# Patient Record
Sex: MALE | Race: WHITE
[De-identification: names, ages, dates, MRNs, and addresses within clinical notes are randomized per-mention and may not be internally consistent; named-entity substitution may affect disease eponyms.]

---

## 2017-08-05 ENCOUNTER — HOSPITAL ENCOUNTER (EMERGENCY)
Dept: HOSPITAL 47 - EC | Age: 42
Discharge: HOME | End: 2017-08-05
Payer: COMMERCIAL

## 2017-08-05 VITALS
SYSTOLIC BLOOD PRESSURE: 120 MMHG | HEART RATE: 64 BPM | TEMPERATURE: 97.5 F | RESPIRATION RATE: 18 BRPM | DIASTOLIC BLOOD PRESSURE: 73 MMHG

## 2017-08-05 DIAGNOSIS — F17.200: ICD-10-CM

## 2017-08-05 DIAGNOSIS — R07.81: Primary | ICD-10-CM
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
## 2017-08-05 PROCEDURE — 99284 EMERGENCY DEPT VISIT MOD MDM: CPT

## 2017-08-05 NOTE — XR
EXAMINATION TYPE: XR ribs LT w pa chest xray

 

DATE OF EXAM: 8/5/2017

 

CLINICAL HISTORY: Chest and left-sided rib pain after fall from tree injury.

 

TECHNIQUE: Single frontal view of the chest is obtained. A frontal and oblique images of the left-raven
ed ribs are acquired.

 

COMPARISON: None

 

FINDINGS:  There is no focal air space opacity, pleural effusion, or pneumothorax seen.  The cardiac 
silhouette size is within normal limits.   The osseous structures are intact.

 

Dedicated images of the left-sided ribs show no acute displaced left-sided rib fracture. Overlying so
ft tissue is unremarkable.

 

IMPRESSION:  No acute cardiopulmonary process. No acute displaced left-sided rib fractures are eviden
t.

## 2017-08-05 NOTE — ED
Chest Pain HPI





- General


Chief Complaint: Chest Pain


Stated Complaint: Rib Pain


Time Seen by Provider: 08/05/17 17:08


Source: patient, EMS


Mode of arrival: EMS





- History of Present Illness


Initial Comments: 


42-year-old male patient presents to emergency department by EMS today for 

evaluation of left rib pain.  Patient states that about 30 minutes ago he was 

up in a tree, he was standing on a branch attempting to bring down the limb 

above him.  Patient states that the branch he was standing on cracked so he 

grabbed onto another branch and "bear hugged it ".  Patient states that the 

branch dug into his ribs.  Patient states that since then he has been having 

pain in his left ribs when he takes deep breaths, or moves at all.  Patient 

denies any fall or other injuries.  Patient denies any shortness of breath, 

abdominal pain, back pain, nausea, vomiting, dizziness, or weakness.








- Related Data


 Previous Rx's











 Medication  Instructions  Recorded


 


Hydrocodone/Acetaminophen [Norco 1 tab PO Q6HR PRN #15 tab 08/05/17





5-325]  


 


Ibuprofen 600 mg PO Q6H PRN #20 tablet 08/05/17











 Allergies











Allergy/AdvReac Type Severity Reaction Status Date / Time


 


No Known Allergies Allergy   Verified 08/05/17 17:42














Review of Systems


ROS Statement: 


Those systems with pertinent positive or pertinent negative responses have been 

documented in the HPI.





ROS Other: All systems not noted in ROS Statement are negative.





Past Medical History


Past Medical History: No Reported History


History of Any Multi-Drug Resistant Organisms: None Reported


Past Surgical History: Unable to Obtain


Past Psychological History: No Psychological Hx Reported


Smoking Status: Current every day smoker


Past Alcohol Use History: Heavy


Past Drug Use History: None Reported





General Exam


General appearance: alert, in no apparent distress


Head exam: Present: atraumatic, normocephalic, normal inspection


Eye exam: Present: normal appearance, PERRL, EOMI.  Absent: scleral icterus, 

conjunctival injection, periorbital swelling


Neck exam: Present: normal inspection, full ROM.  Absent: tenderness, 

meningismus, lymphadenopathy


Respiratory exam: Present: normal lung sounds bilaterally, other (Tenderness 

over the left seventh and eighth ribs near the midclavicular line).  Absent: 

respiratory distress, wheezes, rales, rhonchi, stridor


Cardiovascular Exam: Present: regular rate, normal rhythm, normal heart sounds.

  Absent: systolic murmur, diastolic murmur, rubs, gallop, clicks


GI/Abdominal exam: Present: soft, normal bowel sounds.  Absent: distended, 

tenderness, guarding, rebound, rigid


Extremities exam: Present: normal inspection, full ROM, normal capillary 

refill.  Absent: tenderness, pedal edema, joint swelling, calf tenderness


Back exam: Present: normal inspection.  Absent: tenderness, vertebral tenderness


Neurological exam: Present: alert, oriented X3, CN II-XII intact


Psychiatric exam: Present: normal affect, normal mood


Skin exam: Present: warm, dry, intact, normal color.  Absent: rash





Course


 Vital Signs











  08/05/17 08/05/17





  17:08 18:05


 


Temperature 98.1 F 98.3 F


 


Pulse Rate 73 66


 


Respiratory 20 20





Rate  


 


Blood Pressure 137/74 136/83


 


O2 Sat by Pulse 97 97





Oximetry  














Chest Pain MDM





- MDM


42-year-old male patient presented to the emergency department for evaluation 

of left rib pain after injury.  Chest x-ray was performed and showed no acute 

cardiopulmonary process, x-ray left ribs are obtained showed no acute osseous 

abnormalities.  She'll be discharged home with prescription for ibuprofen as 

well as Norco for pain management.  Patient also given instructions regarding 

coughing and deep breathing.  Patient will be given incentive spirometer.  

Patient injected to follow-up with his primary care physician one to 2 days for 

recheck.  Patient instructed to return for any new, worsening, or concerning 

symptoms.  Patient verbalizes understanding and agrees with this plan.








Disposition


Clinical Impression: 


 Rib pain on left side





Disposition: HOME SELF-CARE


Condition: Good


Instructions:  Costochondritis (ED)


Additional Instructions: 


Splint area with pillow.  Perform coughing and deep breathing exercises at 

least 10 times per hour all week.  Follow up with primary care physician in one 

to 2 days for recheck.  Return for any new, worsening, or concerning symptoms.


Prescriptions: 


Hydrocodone/Acetaminophen [Norco 5-325] 1 tab PO Q6HR PRN #15 tab


 PRN Reason: Pain


Ibuprofen 600 mg PO Q6H PRN #20 tablet


 PRN Reason: Pain


Referrals: 


None,Stated [Primary Care Provider] - 1-2 days


Time of Disposition: 18:22

## 2018-04-02 ENCOUNTER — HOSPITAL ENCOUNTER (EMERGENCY)
Dept: HOSPITAL 47 - EC | Age: 43
LOS: 1 days | Discharge: HOME | End: 2018-04-03
Payer: COMMERCIAL

## 2018-04-02 DIAGNOSIS — F10.120: Primary | ICD-10-CM

## 2018-04-02 DIAGNOSIS — F17.200: ICD-10-CM

## 2018-04-02 DIAGNOSIS — F43.20: ICD-10-CM

## 2018-04-02 DIAGNOSIS — R07.9: ICD-10-CM

## 2018-04-02 LAB
ALBUMIN SERPL-MCNC: 5.1 G/DL (ref 3.5–5)
ALP SERPL-CCNC: 109 U/L (ref 38–126)
ALT SERPL-CCNC: 23 U/L (ref 21–72)
ANION GAP SERPL CALC-SCNC: 20 MMOL/L
APTT BLD: 24.7 SEC (ref 22–30)
AST SERPL-CCNC: 34 U/L (ref 17–59)
BASOPHILS # BLD AUTO: 0.1 K/UL (ref 0–0.2)
BASOPHILS NFR BLD AUTO: 1 %
BUN SERPL-SCNC: 8 MG/DL (ref 9–20)
CALCIUM SPEC-MCNC: 9.5 MG/DL (ref 8.4–10.2)
CHLORIDE SERPL-SCNC: 104 MMOL/L (ref 98–107)
CK SERPL-CCNC: 217 U/L (ref 55–170)
CO2 SERPL-SCNC: 23 MMOL/L (ref 22–30)
EOSINOPHIL # BLD AUTO: 0.3 K/UL (ref 0–0.7)
EOSINOPHIL NFR BLD AUTO: 2 %
ERYTHROCYTE [DISTWIDTH] IN BLOOD BY AUTOMATED COUNT: 5.6 M/UL (ref 4.3–5.9)
ERYTHROCYTE [DISTWIDTH] IN BLOOD: 13 % (ref 11.5–15.5)
GLUCOSE SERPL-MCNC: 82 MG/DL (ref 74–99)
HCT VFR BLD AUTO: 52.5 % (ref 39–53)
HGB BLD-MCNC: 17.6 GM/DL (ref 13–17.5)
INR PPP: 1 (ref ?–1.2)
LIPASE SERPL-CCNC: 297 U/L (ref 23–300)
LYMPHOCYTES # SPEC AUTO: 3.4 K/UL (ref 1–4.8)
LYMPHOCYTES NFR SPEC AUTO: 25 %
MAGNESIUM SPEC-SCNC: 2.2 MG/DL (ref 1.6–2.3)
MCH RBC QN AUTO: 31.5 PG (ref 25–35)
MCHC RBC AUTO-ENTMCNC: 33.6 G/DL (ref 31–37)
MCV RBC AUTO: 93.6 FL (ref 80–100)
MONOCYTES # BLD AUTO: 0.5 K/UL (ref 0–1)
MONOCYTES NFR BLD AUTO: 4 %
NEUTROPHILS # BLD AUTO: 8.9 K/UL (ref 1.3–7.7)
NEUTROPHILS NFR BLD AUTO: 67 %
PLATELET # BLD AUTO: 300 K/UL (ref 150–450)
POTASSIUM SERPL-SCNC: 3.9 MMOL/L (ref 3.5–5.1)
PROT SERPL-MCNC: 8.4 G/DL (ref 6.3–8.2)
PT BLD: 10 SEC (ref 9–12)
SODIUM SERPL-SCNC: 147 MMOL/L (ref 137–145)
TROPONIN I SERPL-MCNC: <0.012 NG/ML (ref 0–0.03)
WBC # BLD AUTO: 13.3 K/UL (ref 3.8–10.6)

## 2018-04-02 PROCEDURE — 80053 COMPREHEN METABOLIC PANEL: CPT

## 2018-04-02 PROCEDURE — 96361 HYDRATE IV INFUSION ADD-ON: CPT

## 2018-04-02 PROCEDURE — 83735 ASSAY OF MAGNESIUM: CPT

## 2018-04-02 PROCEDURE — 84484 ASSAY OF TROPONIN QUANT: CPT

## 2018-04-02 PROCEDURE — 82075 ASSAY OF BREATH ETHANOL: CPT

## 2018-04-02 PROCEDURE — 83690 ASSAY OF LIPASE: CPT

## 2018-04-02 PROCEDURE — 93005 ELECTROCARDIOGRAM TRACING: CPT

## 2018-04-02 PROCEDURE — 85610 PROTHROMBIN TIME: CPT

## 2018-04-02 PROCEDURE — 36415 COLL VENOUS BLD VENIPUNCTURE: CPT

## 2018-04-02 PROCEDURE — 96360 HYDRATION IV INFUSION INIT: CPT

## 2018-04-02 PROCEDURE — 82550 ASSAY OF CK (CPK): CPT

## 2018-04-02 PROCEDURE — 85730 THROMBOPLASTIN TIME PARTIAL: CPT

## 2018-04-02 PROCEDURE — 99285 EMERGENCY DEPT VISIT HI MDM: CPT

## 2018-04-02 PROCEDURE — 71046 X-RAY EXAM CHEST 2 VIEWS: CPT

## 2018-04-02 PROCEDURE — 80306 DRUG TEST PRSMV INSTRMNT: CPT

## 2018-04-02 PROCEDURE — 85025 COMPLETE CBC W/AUTO DIFF WBC: CPT

## 2018-04-02 PROCEDURE — 82553 CREATINE MB FRACTION: CPT

## 2018-04-02 NOTE — XR
EXAMINATION TYPE: XR chest 2V

 

DATE OF EXAM: 4/2/2018

 

COMPARISON: 8/5/2017

 

HISTORY: Chest pain

 

TECHNIQUE:  Frontal and lateral views of the chest are obtained.

 

FINDINGS:  Heart and mediastinum are normal. Lungs are clear of infiltrate. There is no pleural effus
ion. Bony thorax is intact.

 

IMPRESSION:  No active cardiopulmonary disease. No change.

## 2018-04-02 NOTE — ED
General Adult HPI





- General


Source: patient, EMS, RN notes reviewed, old records reviewed


Mode of arrival: EMS





<Kevin Gastelum - Last Filed: 04/02/18 18:45>





<Allen Schaeffer - Last Filed: 04/03/18 04:33>





- General


Chief complaint: Psychiatric Symptoms


Stated complaint: EPS eval


Time Seen by Provider: 04/02/18 17:14





- History of Present Illness


Initial comments: 





This is a 43-year-old male the ER for evaluation.  Patient comes in for 

suicidal thoughts, alcohol intoxication.  Chest pain.  Patient has no 

significant heart history.  Symptoms for a week as far as chest pain.  Patient 

presents secondary to mother's death and has been suicidal since (Kevin Gastelum)





- Related Data


 Home Medications











 Medication  Instructions  Recorded  Confirmed


 


No Known Home Medications [No  04/02/18 04/02/18





Known Home Medications]   











 Allergies











Allergy/AdvReac Type Severity Reaction Status Date / Time


 


No Known Allergies Allergy   Verified 04/02/18 17:18














Review of Systems


ROS Other: All systems not noted in ROS Statement are negative.





<Kevin Gastelum - Last Filed: 04/02/18 18:45>


ROS Other: All systems not noted in ROS Statement are negative.





<Allen Schaeffer - Last Filed: 04/03/18 04:33>


ROS Statement: 


Those systems with pertinent positive or pertinent negative responses have been 

documented in the HPI.








Past Medical History


Past Medical History: No Reported History


History of Any Multi-Drug Resistant Organisms: None Reported


Past Surgical History: Unable to Obtain


Past Psychological History: No Psychological Hx Reported


Smoking Status: Current every day smoker


Past Alcohol Use History: Heavy


Past Drug Use History: None Reported





<Kevin Gastelum - Last Filed: 04/02/18 18:45>





General Exam


General appearance: alert, in no apparent distress


Head exam: Present: atraumatic, normocephalic, normal inspection


Eye exam: Present: normal appearance, PERRL, EOMI.  Absent: scleral icterus, 

conjunctival injection, periorbital swelling


ENT exam: Present: normal exam, mucous membranes moist


Neck exam: Present: normal inspection.  Absent: tenderness, meningismus, 

lymphadenopathy


Respiratory exam: Present: normal lung sounds bilaterally.  Absent: respiratory 

distress, wheezes, rales, rhonchi, stridor


Cardiovascular Exam: Present: regular rate, normal rhythm, normal heart sounds.

  Absent: systolic murmur, diastolic murmur, rubs, gallop, clicks


GI/Abdominal exam: Present: soft, normal bowel sounds.  Absent: distended, 

tenderness, guarding, rebound, rigid


Extremities exam: Present: normal inspection, full ROM, normal capillary 

refill.  Absent: tenderness, pedal edema, joint swelling, calf tenderness


Back exam: Present: normal inspection


Neurological exam: Present: alert, oriented X3, CN II-XII intact


Psychiatric exam: Present: normal affect, normal mood


Skin exam: Present: warm, dry, intact, normal color.  Absent: rash





<Kevin Gastelum - Last Filed: 04/02/18 18:45>





 Vital Signs











  04/02/18 04/02/18 04/02/18





  16:58 17:20 18:53


 


Temperature 97.6 F  


 


Pulse Rate 81 75 72


 


Respiratory 18 18 18





Rate   


 


Blood Pressure 175/119 146/90 142/77


 


O2 Sat by Pulse 97  96





Oximetry   














  04/02/18 04/02/18 04/02/18





  19:25 20:20 22:27


 


Temperature   


 


Pulse Rate 68 82 87


 


Respiratory 16 16 16





Rate   


 


Blood Pressure 135/77 138/89 132/66


 


O2 Sat by Pulse 97 94 L 91 L





Oximetry   














  04/02/18 04/03/18 04/03/18





  23:25 00:20 02:12


 


Temperature   


 


Pulse Rate 94 88 87


 


Respiratory 17 16 15





Rate   


 


Blood Pressure 124/63 124/63 131/74


 


O2 Sat by Pulse 97 93 L 94 L





Oximetry   














  04/03/18





  02:20


 


Temperature 


 


Pulse Rate 88


 


Respiratory 16





Rate 


 


Blood Pressure 146/69


 


O2 Sat by Pulse 97





Oximetry 














EKG Findings





- EKG Comments:


EKG Findings:: EKG shows normal sinus rhythm rate of 66, , QRS 94, 





<Kevin Gastelum - Last Filed: 04/02/18 18:45>





Medical Decision Making





- Lab Data


Result diagrams: 


 04/02/18 18:00





 04/02/18 18:00





<Kevin Gastelum - Last Filed: 04/02/18 18:45>





- Lab Data


Result diagrams: 


 04/02/18 18:00





 04/02/18 18:00





<Allen Schaeffer - Last Filed: 04/03/18 04:33>





- Lab Data





 Lab Results











  04/02/18 04/02/18 04/02/18 Range/Units





  17:55 18:00 18:00 


 


WBC    13.3 H  (3.8-10.6)  k/uL


 


RBC    5.60  (4.30-5.90)  m/uL


 


Hgb    17.6 H  (13.0-17.5)  gm/dL


 


Hct    52.5  (39.0-53.0)  %


 


MCV    93.6  (80.0-100.0)  fL


 


MCH    31.5  (25.0-35.0)  pg


 


MCHC    33.6  (31.0-37.0)  g/dL


 


RDW    13.0  (11.5-15.5)  %


 


Plt Count    300  (150-450)  k/uL


 


Neutrophils %    67  %


 


Lymphocytes %    25  %


 


Monocytes %    4  %


 


Eosinophils %    2  %


 


Basophils %    1  %


 


Neutrophils #    8.9 H  (1.3-7.7)  k/uL


 


Lymphocytes #    3.4  (1.0-4.8)  k/uL


 


Monocytes #    0.5  (0-1.0)  k/uL


 


Eosinophils #    0.3  (0-0.7)  k/uL


 


Basophils #    0.1  (0-0.2)  k/uL


 


PT     (9.0-12.0)  sec


 


INR     (<1.2)  


 


APTT     (22.0-30.0)  sec


 


Sodium     (137-145)  mmol/L


 


Potassium     (3.5-5.1)  mmol/L


 


Chloride     ()  mmol/L


 


Carbon Dioxide     (22-30)  mmol/L


 


Anion Gap     mmol/L


 


BUN     (9-20)  mg/dL


 


Creatinine     (0.66-1.25)  mg/dL


 


Est GFR (CKD-EPI)AfAm     (>60 ml/min/1.73 sqM)  


 


Est GFR (CKD-EPI)NonAf     (>60 ml/min/1.73 sqM)  


 


Glucose     (74-99)  mg/dL


 


Calcium     (8.4-10.2)  mg/dL


 


Magnesium     (1.6-2.3)  mg/dL


 


Total Bilirubin     (0.2-1.3)  mg/dL


 


AST     (17-59)  U/L


 


ALT     (21-72)  U/L


 


Alkaline Phosphatase     ()  U/L


 


Total Creatine Kinase   217 H   ()  U/L


 


CK-MB (CK-2)   1.3   (0.0-2.4)  ng/mL


 


CK-MB (CK-2) Rel Index   0.6   


 


Troponin I   <0.012   (0.000-0.034)  ng/mL


 


Total Protein     (6.3-8.2)  g/dL


 


Albumin     (3.5-5.0)  g/dL


 


Lipase     ()  U/L


 


Urine Opiates Screen  Not Detected    (NotDetected)  


 


Ur Oxycodone Screen  Not Detected    (NotDetected)  


 


Urine Methadone Screen  Not Detected    (NotDetected)  


 


Ur Propoxyphene Screen  Not Detected    (NotDetected)  


 


Ur Barbiturates Screen  Not Detected    (NotDetected)  


 


U Tricyclic Antidepress  Not Detected    (NotDetected)  


 


Ur Phencyclidine Scrn  Not Detected    (NotDetected)  


 


Ur Amphetamines Screen  Not Detected    (NotDetected)  


 


U Methamphetamines Scrn  Not Detected    (NotDetected)  


 


U Benzodiazepines Scrn  Not Detected    (NotDetected)  


 


Urine Cocaine Screen  Not Detected    (NotDetected)  


 


U Marijuana (THC) Screen  Not Detected    (NotDetected)  














  04/02/18 04/02/18 Range/Units





  18:00 18:00 


 


WBC    (3.8-10.6)  k/uL


 


RBC    (4.30-5.90)  m/uL


 


Hgb    (13.0-17.5)  gm/dL


 


Hct    (39.0-53.0)  %


 


MCV    (80.0-100.0)  fL


 


MCH    (25.0-35.0)  pg


 


MCHC    (31.0-37.0)  g/dL


 


RDW    (11.5-15.5)  %


 


Plt Count    (150-450)  k/uL


 


Neutrophils %    %


 


Lymphocytes %    %


 


Monocytes %    %


 


Eosinophils %    %


 


Basophils %    %


 


Neutrophils #    (1.3-7.7)  k/uL


 


Lymphocytes #    (1.0-4.8)  k/uL


 


Monocytes #    (0-1.0)  k/uL


 


Eosinophils #    (0-0.7)  k/uL


 


Basophils #    (0-0.2)  k/uL


 


PT   10.0  (9.0-12.0)  sec


 


INR   1.0  (<1.2)  


 


APTT   24.7  (22.0-30.0)  sec


 


Sodium  147 H   (137-145)  mmol/L


 


Potassium  3.9   (3.5-5.1)  mmol/L


 


Chloride  104   ()  mmol/L


 


Carbon Dioxide  23   (22-30)  mmol/L


 


Anion Gap  20   mmol/L


 


BUN  8 L   (9-20)  mg/dL


 


Creatinine  0.70   (0.66-1.25)  mg/dL


 


Est GFR (CKD-EPI)AfAm  >90   (>60 ml/min/1.73 sqM)  


 


Est GFR (CKD-EPI)NonAf  >90   (>60 ml/min/1.73 sqM)  


 


Glucose  82   (74-99)  mg/dL


 


Calcium  9.5   (8.4-10.2)  mg/dL


 


Magnesium  2.2   (1.6-2.3)  mg/dL


 


Total Bilirubin  0.3   (0.2-1.3)  mg/dL


 


AST  34   (17-59)  U/L


 


ALT  23   (21-72)  U/L


 


Alkaline Phosphatase  109   ()  U/L


 


Total Creatine Kinase    ()  U/L


 


CK-MB (CK-2)    (0.0-2.4)  ng/mL


 


CK-MB (CK-2) Rel Index    


 


Troponin I    (0.000-0.034)  ng/mL


 


Total Protein  8.4 H   (6.3-8.2)  g/dL


 


Albumin  5.1 H   (3.5-5.0)  g/dL


 


Lipase  297   ()  U/L


 


Urine Opiates Screen    (NotDetected)  


 


Ur Oxycodone Screen    (NotDetected)  


 


Urine Methadone Screen    (NotDetected)  


 


Ur Propoxyphene Screen    (NotDetected)  


 


Ur Barbiturates Screen    (NotDetected)  


 


U Tricyclic Antidepress    (NotDetected)  


 


Ur Phencyclidine Scrn    (NotDetected)  


 


Ur Amphetamines Screen    (NotDetected)  


 


U Methamphetamines Scrn    (NotDetected)  


 


U Benzodiazepines Scrn    (NotDetected)  


 


Urine Cocaine Screen    (NotDetected)  


 


U Marijuana (THC) Screen    (NotDetected)  














Disposition





<Kevin Gastelum - Last Filed: 04/02/18 18:45>





<Allen Schaeffer - Last Filed: 04/03/18 04:33>


Clinical Impression: 


 Alcohol intoxication, Adjustment reaction





Disposition: HOME SELF-CARE


Condition: Good


Instructions:  Alcohol Intoxication (ED), Mood Disorders (ED)


Referrals: 


None,Stated [Primary Care Provider] - 1-2 days

## 2018-04-03 VITALS — SYSTOLIC BLOOD PRESSURE: 142 MMHG | DIASTOLIC BLOOD PRESSURE: 76 MMHG | TEMPERATURE: 98 F | HEART RATE: 96 BPM

## 2018-04-03 VITALS — RESPIRATION RATE: 16 BRPM

## 2021-09-17 ENCOUNTER — HOSPITAL ENCOUNTER (EMERGENCY)
Dept: HOSPITAL 47 - EC | Age: 46
Discharge: HOME | End: 2021-09-17
Payer: COMMERCIAL

## 2021-09-17 VITALS
RESPIRATION RATE: 16 BRPM | SYSTOLIC BLOOD PRESSURE: 120 MMHG | TEMPERATURE: 98.1 F | HEART RATE: 81 BPM | DIASTOLIC BLOOD PRESSURE: 78 MMHG

## 2021-09-17 DIAGNOSIS — F17.200: ICD-10-CM
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
## 2021-09-17 DIAGNOSIS — R07.9: Primary | ICD-10-CM

## 2021-09-17 DIAGNOSIS — F10.10: ICD-10-CM

## 2021-09-17 LAB
ALBUMIN SERPL-MCNC: 4.5 G/DL (ref 3.5–5)
ALP SERPL-CCNC: 103 U/L (ref 38–126)
ALT SERPL-CCNC: 37 U/L (ref 4–49)
ANION GAP SERPL CALC-SCNC: 13 MMOL/L
APTT BLD: 25 SEC (ref 22–30)
AST SERPL-CCNC: 56 U/L (ref 17–59)
BASOPHILS # BLD AUTO: 0.1 K/UL (ref 0–0.2)
BASOPHILS NFR BLD AUTO: 1 %
BUN SERPL-SCNC: 4 MG/DL (ref 9–20)
CALCIUM SPEC-MCNC: 9.1 MG/DL (ref 8.4–10.2)
CHLORIDE SERPL-SCNC: 104 MMOL/L (ref 98–107)
CO2 SERPL-SCNC: 24 MMOL/L (ref 22–30)
EOSINOPHIL # BLD AUTO: 0.3 K/UL (ref 0–0.7)
EOSINOPHIL NFR BLD AUTO: 4 %
ERYTHROCYTE [DISTWIDTH] IN BLOOD BY AUTOMATED COUNT: 4.81 M/UL (ref 4.3–5.9)
ERYTHROCYTE [DISTWIDTH] IN BLOOD: 13.2 % (ref 11.5–15.5)
GLUCOSE SERPL-MCNC: 126 MG/DL (ref 74–99)
HCT VFR BLD AUTO: 48.1 % (ref 39–53)
HGB BLD-MCNC: 15.8 GM/DL (ref 13–17.5)
INR PPP: 0.9 (ref ?–1.2)
LIPASE SERPL-CCNC: 220 U/L (ref 23–300)
LYMPHOCYTES # SPEC AUTO: 1.8 K/UL (ref 1–4.8)
LYMPHOCYTES NFR SPEC AUTO: 25 %
MAGNESIUM SPEC-SCNC: 2 MG/DL (ref 1.6–2.3)
MCH RBC QN AUTO: 32.9 PG (ref 25–35)
MCHC RBC AUTO-ENTMCNC: 32.9 G/DL (ref 31–37)
MCV RBC AUTO: 100 FL (ref 80–100)
MONOCYTES # BLD AUTO: 0.4 K/UL (ref 0–1)
MONOCYTES NFR BLD AUTO: 6 %
NEUTROPHILS # BLD AUTO: 4.3 K/UL (ref 1.3–7.7)
NEUTROPHILS NFR BLD AUTO: 61 %
PLATELET # BLD AUTO: 252 K/UL (ref 150–450)
POTASSIUM SERPL-SCNC: 4 MMOL/L (ref 3.5–5.1)
PROT SERPL-MCNC: 7.4 G/DL (ref 6.3–8.2)
PT BLD: 10 SEC (ref 9–12)
SODIUM SERPL-SCNC: 141 MMOL/L (ref 137–145)
WBC # BLD AUTO: 7 K/UL (ref 3.8–10.6)

## 2021-09-17 PROCEDURE — 83735 ASSAY OF MAGNESIUM: CPT

## 2021-09-17 PROCEDURE — 83690 ASSAY OF LIPASE: CPT

## 2021-09-17 PROCEDURE — 85610 PROTHROMBIN TIME: CPT

## 2021-09-17 PROCEDURE — 71046 X-RAY EXAM CHEST 2 VIEWS: CPT

## 2021-09-17 PROCEDURE — 85730 THROMBOPLASTIN TIME PARTIAL: CPT

## 2021-09-17 PROCEDURE — 80320 DRUG SCREEN QUANTALCOHOLS: CPT

## 2021-09-17 PROCEDURE — 84484 ASSAY OF TROPONIN QUANT: CPT

## 2021-09-17 PROCEDURE — 99285 EMERGENCY DEPT VISIT HI MDM: CPT

## 2021-09-17 PROCEDURE — 96360 HYDRATION IV INFUSION INIT: CPT

## 2021-09-17 PROCEDURE — 36415 COLL VENOUS BLD VENIPUNCTURE: CPT

## 2021-09-17 PROCEDURE — 93005 ELECTROCARDIOGRAM TRACING: CPT

## 2021-09-17 PROCEDURE — 85025 COMPLETE CBC W/AUTO DIFF WBC: CPT

## 2021-09-17 PROCEDURE — 80053 COMPREHEN METABOLIC PANEL: CPT

## 2021-09-17 NOTE — ED
General Adult HPI





- General


Chief complaint: Chest Pain


Stated complaint: ETOH/Chest pain


Time Seen by Provider: 09/17/21 11:43


Source: patient, EMS, RN notes reviewed, old records reviewed


Mode of arrival: EMS


Limitations: no limitations





- History of Present Illness


Initial comments: 





46-year-old male presenting with alcohol intoxication, and chest pain.  Patient 

states he has central chest pain for the past several years.  He has this every 

single day.  He admits to consuming a significant amount of alcoholic beverages 

today.  He denies any change in his chest pain.  Does not radiate.  He has no 

associated dyspnea.  No lower extremity pain or swelling.  No abdominal pain 

nausea or vomiting.





- Related Data


                                Home Medications











 Medication  Instructions  Recorded  Confirmed


 


No Known Home Medications  04/02/18 04/02/18











                                    Allergies











Allergy/AdvReac Type Severity Reaction Status Date / Time


 


No Known Allergies Allergy   Verified 04/02/18 17:18














Review of Systems


ROS Statement: 


Those systems with pertinent positive or pertinent negative responses have been 

documented in the HPI.





ROS Other: All systems not noted in ROS Statement are negative.





Past Medical History


Past Medical History: No Reported History


History of Any Multi-Drug Resistant Organisms: None Reported


Past Surgical History: Unable to Obtain


Past Psychological History: No Psychological Hx Reported


Smoking Status: Current every day smoker


Past Alcohol Use History: Daily, Heavy


Past Drug Use History: None Reported





General Exam


Limitations: no limitations


General appearance: alert, appears intoxicated


Head exam: Present: atraumatic, normocephalic


Eye exam: Present: normal appearance, PERRL


ENT exam: Present: mucous membranes dry


Neck exam: Present: normal inspection.  Absent: tenderness, meningismus


Respiratory exam: Present: normal lung sounds bilaterally.  Absent: respiratory 

distress, wheezes


Cardiovascular Exam: Present: regular rate, normal rhythm


GI/Abdominal exam: Present: soft.  Absent: distended, tenderness


Extremities exam: Present: normal inspection, normal capillary refill.  Absent: 

pedal edema


Neurological exam: Present: alert, oriented X3, CN II-XII intact.  Absent: motor

sensory deficit


Psychiatric exam: Present: agitated


Skin exam: Present: warm, dry, intact





Course


                                   Vital Signs











  09/17/21 09/17/21





  11:43 12:29


 


Temperature 98.2 F 


 


Pulse Rate 77 89


 


Respiratory 18 20





Rate  


 


Blood Pressure 147/90 123/81


 


O2 Sat by Pulse 92 L 95





Oximetry  














EKG Findings





- EKG Comments:


EKG Findings:: EKG: Normal sinus rhythm, rate of 87, GA interval 136, QRS 

duration 90, , no ST segment elevation.





Medical Decision Making





- Medical Decision Making





46-year-old male presenting with alcohol intoxication, no suicidal ideation.  

Patient cooperative with stable vitals.  His workup includes EKG which is sinus 

rhythm without ST segment elevation or ischemic changes.  Chest x-ray is clear 

without focal pneumonia or acute findings.  He has a normal CBC, normal CMP, 

negative troponin.  Given the fact that this is been ongoing for many years I 

feel he can continue to follow up as an outpatient regarding this chest pain.  H

is alcohol level is 340 he is coherent and able to give detailed history he is 

accompanied by his girlfriend.  She will take the patient home.  Return 

parameters are discussed.





- Lab Data


Result diagrams: 


                                 09/17/21 12:02





                                 09/17/21 12:02


                                   Lab Results











  09/17/21 09/17/21 09/17/21 Range/Units





  12:02 12:02 12:02 


 


WBC  7.0    (3.8-10.6)  k/uL


 


RBC  4.81    (4.30-5.90)  m/uL


 


Hgb  15.8    (13.0-17.5)  gm/dL


 


Hct  48.1    (39.0-53.0)  %


 


MCV  100.0    (80.0-100.0)  fL


 


MCH  32.9    (25.0-35.0)  pg


 


MCHC  32.9    (31.0-37.0)  g/dL


 


RDW  13.2    (11.5-15.5)  %


 


Plt Count  252    (150-450)  k/uL


 


MPV  7.0    


 


Neutrophils %  61    %


 


Lymphocytes %  25    %


 


Monocytes %  6    %


 


Eosinophils %  4    %


 


Basophils %  1    %


 


Neutrophils #  4.3    (1.3-7.7)  k/uL


 


Lymphocytes #  1.8    (1.0-4.8)  k/uL


 


Monocytes #  0.4    (0-1.0)  k/uL


 


Eosinophils #  0.3    (0-0.7)  k/uL


 


Basophils #  0.1    (0-0.2)  k/uL


 


PT   10.0   (9.0-12.0)  sec


 


INR   0.9   (<1.2)  


 


APTT   25.0   (22.0-30.0)  sec


 


Sodium    141  (137-145)  mmol/L


 


Potassium    4.0  (3.5-5.1)  mmol/L


 


Chloride    104  ()  mmol/L


 


Carbon Dioxide    24  (22-30)  mmol/L


 


Anion Gap    13  mmol/L


 


BUN    4 L  (9-20)  mg/dL


 


Creatinine    0.62 L  (0.66-1.25)  mg/dL


 


Est GFR (CKD-EPI)AfAm    >90  (>60 ml/min/1.73 sqM)  


 


Est GFR (CKD-EPI)NonAf    >90  (>60 ml/min/1.73 sqM)  


 


Glucose    126 H  (74-99)  mg/dL


 


Calcium    9.1  (8.4-10.2)  mg/dL


 


Magnesium    2.0  (1.6-2.3)  mg/dL


 


Total Bilirubin    0.5  (0.2-1.3)  mg/dL


 


AST    56  (17-59)  U/L


 


ALT    37  (4-49)  U/L


 


Alkaline Phosphatase    103  ()  U/L


 


Troponin I     (0.000-0.034)  ng/mL


 


Total Protein    7.4  (6.3-8.2)  g/dL


 


Albumin    4.5  (3.5-5.0)  g/dL


 


Lipase    220  ()  U/L


 


Serum Alcohol    344 H*  mg/dL














  09/17/21 Range/Units





  12:02 


 


WBC   (3.8-10.6)  k/uL


 


RBC   (4.30-5.90)  m/uL


 


Hgb   (13.0-17.5)  gm/dL


 


Hct   (39.0-53.0)  %


 


MCV   (80.0-100.0)  fL


 


MCH   (25.0-35.0)  pg


 


MCHC   (31.0-37.0)  g/dL


 


RDW   (11.5-15.5)  %


 


Plt Count   (150-450)  k/uL


 


MPV   


 


Neutrophils %   %


 


Lymphocytes %   %


 


Monocytes %   %


 


Eosinophils %   %


 


Basophils %   %


 


Neutrophils #   (1.3-7.7)  k/uL


 


Lymphocytes #   (1.0-4.8)  k/uL


 


Monocytes #   (0-1.0)  k/uL


 


Eosinophils #   (0-0.7)  k/uL


 


Basophils #   (0-0.2)  k/uL


 


PT   (9.0-12.0)  sec


 


INR   (<1.2)  


 


APTT   (22.0-30.0)  sec


 


Sodium   (137-145)  mmol/L


 


Potassium   (3.5-5.1)  mmol/L


 


Chloride   ()  mmol/L


 


Carbon Dioxide   (22-30)  mmol/L


 


Anion Gap   mmol/L


 


BUN   (9-20)  mg/dL


 


Creatinine   (0.66-1.25)  mg/dL


 


Est GFR (CKD-EPI)AfAm   (>60 ml/min/1.73 sqM)  


 


Est GFR (CKD-EPI)NonAf   (>60 ml/min/1.73 sqM)  


 


Glucose   (74-99)  mg/dL


 


Calcium   (8.4-10.2)  mg/dL


 


Magnesium   (1.6-2.3)  mg/dL


 


Total Bilirubin   (0.2-1.3)  mg/dL


 


AST   (17-59)  U/L


 


ALT   (4-49)  U/L


 


Alkaline Phosphatase   ()  U/L


 


Troponin I  <0.012  (0.000-0.034)  ng/mL


 


Total Protein   (6.3-8.2)  g/dL


 


Albumin   (3.5-5.0)  g/dL


 


Lipase   ()  U/L


 


Serum Alcohol   mg/dL














Disposition


Clinical Impression: 


 Chest pain, Alcohol abuse





Disposition: HOME SELF-CARE


Condition: Fair


Instructions (If sedation given, give patient instructions):  Chest Pain (ED), 

Alcohol Intoxication (ED)


Additional Instructions: 


Please return with worsening or changing symptoms.  Please follow up with her 

primary care physician, You have been provided information regarding AA.


Is patient prescribed a controlled substance at d/c from ED?: No


Referrals: 


None,Stated [Primary Care Provider] - 1-2 days


Silvia Amador MD [REFERRING] - 1-2 days


Time of Disposition: 12:56

## 2021-09-17 NOTE — XR
EXAMINATION TYPE: XR chest 2V

 

DATE OF EXAM: 9/17/2021

 

COMPARISON: 4/2/2018

 

TECHNIQUE: PA and lateral views submitted.

 

HISTORY: Chest pain

 

FINDINGS:

The lungs are clear and  there is no pneumothorax, pleural effusion, or focal pneumonia.  Heart size 
normal. No overt failure. Chronic rib deformities and lower involving the right lower rib cage.

 

IMPRESSION: 

1. No acute process.

## 2021-10-19 ENCOUNTER — HOSPITAL ENCOUNTER (EMERGENCY)
Dept: HOSPITAL 47 - EC | Age: 46
LOS: 1 days | Discharge: HOME | End: 2021-10-20
Payer: COMMERCIAL

## 2021-10-19 DIAGNOSIS — F10.129: ICD-10-CM

## 2021-10-19 DIAGNOSIS — R45.851: Primary | ICD-10-CM

## 2021-10-19 DIAGNOSIS — F17.200: ICD-10-CM
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
## 2021-10-19 LAB — GLUCOSE BLD-MCNC: 98 MG/DL (ref 75–99)

## 2021-10-19 PROCEDURE — 36415 COLL VENOUS BLD VENIPUNCTURE: CPT

## 2021-10-19 PROCEDURE — 99285 EMERGENCY DEPT VISIT HI MDM: CPT

## 2021-10-19 NOTE — ED
Psych HPI





<Geoffrey Ba - Last Filed: 10/20/21 09:51>





- General


Source: EMS, RN notes reviewed, old records reviewed


Mode of arrival: EMS


Limitations: altered mental status





- History of Present Illness


MD Complaint: suicidal ideation, feels depressed


-: unknown


Associated Psychiatric Symptoms: depression, suicidal ideation


History of same: Yes


Quality: intermittent, getting worse


Improves With: none


Worsens With: none


Context: recent alcohol abuse


Associated Symptoms: denies other symptoms


Treatments Prior to Arrival: placed on mental health hold


If Self Harm: admits thoughts of self harm





<Kevin Gastelum - Last Filed: 11/02/21 02:34>





- General


Chief Complaint: Psychiatric Symptoms


Stated Complaint: Mental Health


Time Seen by Provider: 10/19/21 22:18





- History of Present Illness


Initial Comments: 





This is a 46-year-old male to the emergency.  Patient has a for psychiatric 

evaluation and treatment.  Patient is under severe stress patient also has 

significant history of alcohol abuse (Kevin Gastelum)





- Related Data


                                Home Medications











 Medication  Instructions  Recorded  Confirmed


 


No Known Home Medications  04/02/18 10/19/21











                                    Allergies











Allergy/AdvReac Type Severity Reaction Status Date / Time


 


No Known Allergies Allergy   Verified 10/19/21 22:47














Review of Systems


ROS Other: All systems not noted in ROS Statement are negative.





<Geoffrey Ba - Last Filed: 10/20/21 09:51>


ROS Other: All systems not noted in ROS Statement are negative.





<Kevin Gastelum - Last Filed: 11/02/21 02:34>


ROS Statement: 


Those systems with pertinent positive or pertinent negative responses have been 

documented in the HPI.








Past Medical History


Past Medical History: No Reported History


History of Any Multi-Drug Resistant Organisms: None Reported


Past Surgical History: Unable to Obtain


Past Psychological History: No Psychological Hx Reported


Smoking Status: Current every day smoker


Past Alcohol Use History: Daily, Heavy


Past Drug Use History: None Reported





<Kevin Gastelum - Last Filed: 11/02/21 02:34>





General Exam


Limitations: altered mental status


General appearance: appears intoxicated, anxious


Head exam: Present: atraumatic, normocephalic, normal inspection


Eye exam: Present: normal appearance, PERRL, EOMI.  Absent: scleral icterus, 

conjunctival injection, periorbital swelling


ENT exam: Present: normal exam, mucous membranes moist


Neck exam: Present: normal inspection.  Absent: tenderness, meningismus, 

lymphadenopathy


Respiratory exam: Present: normal lung sounds bilaterally.  Absent: respiratory 

distress, wheezes, rales, rhonchi, stridor


Cardiovascular Exam: Present: regular rate, normal rhythm, normal heart sounds. 

Absent: systolic murmur, diastolic murmur, rubs, gallop, clicks


GI/Abdominal exam: Present: soft, normal bowel sounds.  Absent: distended, 

tenderness, guarding, rebound, rigid


Extremities exam: Present: normal inspection, full ROM, normal capillary refill.

 Absent: tenderness, pedal edema, joint swelling, calf tenderness


Back exam: Present: normal inspection


Neurological exam: Present: alert, oriented X3, CN II-XII intact


Psychiatric exam: Present: normal affect, normal mood


Skin exam: Present: warm, dry, intact, normal color.  Absent: rash





<Kevin Gastelum - Last Filed: 11/02/21 02:34>





Course





<Kevin Gastelum - Last Filed: 11/02/21 02:34>


                                   Vital Signs











  10/19/21 10/20/21 10/20/21





  22:03 02:28 03:00


 


Temperature 97.6 F  


 


Pulse Rate 62  


 


Respiratory 16 18 18





Rate   


 


Blood Pressure 116/71  


 


O2 Sat by Pulse 97  





Oximetry   














  10/20/21 10/20/21 10/20/21





  04:00 05:00 06:30


 


Temperature   97.5 F L


 


Pulse Rate   60


 


Respiratory 18 18 18





Rate   


 


Blood Pressure   103/62


 


O2 Sat by Pulse   97





Oximetry   














  10/20/21 10/20/21





  07:17 10:34


 


Temperature  


 


Pulse Rate 63 66


 


Respiratory 18 18





Rate  


 


Blood Pressure 119/86 120/69


 


O2 Sat by Pulse 98 98





Oximetry  














- Reevaluation(s)


Reevaluation #1: 





Medical record is reviewed





Patient was admitted out to lose clinically sober





Patient stable for psychiatric evaluation (Kevin Gastelum)





Medical Decision Making





<Geoffrey Ba - Last Filed: 10/20/21 09:51>





<Kevin Gastelum - Last Filed: 11/02/21 02:34>





- Medical Decision Making


Patient was invited by EPS.  They recommended discharge.  He did discuss safety 

plan.  Patient is agreeable.


 (Geoffrey Ba)


46 male be discharged home after being seen evaluation psychiatry here in the ER

(Kevin Gastelum)





- Lab Data


                                   Lab Results











  10/19/21 Range/Units





  21:44 


 


POC Glucose (mg/dL)  98  (75-99)  mg/dL


 


POC Glu Operater ID  Dieudonne Navarro  














Disposition


Is patient prescribed a controlled substance at d/c from ED?: No





<Geoffrey Ba - Last Filed: 10/20/21 09:51>


Is patient prescribed a controlled substance at d/c from ED?: No





<Kevin Gastelum - Last Filed: 11/02/21 02:34>


Clinical Impression: 


 Alcoholic intoxication, Suicidal ideation





Disposition: HOME SELF-CARE


Condition: Good


Instructions (If sedation given, give patient instructions):  Alcohol 

Intoxication (ED)


Referrals: 


None,Stated [Primary Care Provider] - 1-2 days

## 2021-10-20 VITALS — TEMPERATURE: 97.5 F

## 2021-10-20 VITALS — DIASTOLIC BLOOD PRESSURE: 69 MMHG | HEART RATE: 66 BPM | SYSTOLIC BLOOD PRESSURE: 120 MMHG

## 2021-10-20 VITALS — RESPIRATION RATE: 18 BRPM

## 2021-11-13 ENCOUNTER — HOSPITAL ENCOUNTER (EMERGENCY)
Dept: HOSPITAL 47 - EC | Age: 46
LOS: 1 days | Discharge: HOME | End: 2021-11-14
Payer: COMMERCIAL

## 2021-11-13 VITALS — TEMPERATURE: 98.2 F

## 2021-11-13 DIAGNOSIS — F17.200: ICD-10-CM

## 2021-11-13 DIAGNOSIS — F10.129: Primary | ICD-10-CM
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
## 2021-11-13 PROCEDURE — 84100 ASSAY OF PHOSPHORUS: CPT

## 2021-11-13 PROCEDURE — 83735 ASSAY OF MAGNESIUM: CPT

## 2021-11-13 PROCEDURE — 80320 DRUG SCREEN QUANTALCOHOLS: CPT

## 2021-11-13 PROCEDURE — 80053 COMPREHEN METABOLIC PANEL: CPT

## 2021-11-13 PROCEDURE — 96361 HYDRATE IV INFUSION ADD-ON: CPT

## 2021-11-13 PROCEDURE — 85025 COMPLETE CBC W/AUTO DIFF WBC: CPT

## 2021-11-13 PROCEDURE — 99285 EMERGENCY DEPT VISIT HI MDM: CPT

## 2021-11-13 PROCEDURE — 96360 HYDRATION IV INFUSION INIT: CPT

## 2021-11-13 PROCEDURE — 83690 ASSAY OF LIPASE: CPT

## 2021-11-13 PROCEDURE — 36415 COLL VENOUS BLD VENIPUNCTURE: CPT

## 2021-11-13 NOTE — ED
Alcohol HPI





- General


Stated Complaint: ETOH


Time Seen by Provider: 11/13/21 21:27


Source: RN notes reviewed, old records reviewed


Mode of arrival: EMS


Limitations: no limitations





- History of Present Illness


Initial Comments: 





This is a 46-year-old male presents today for evaluation.  Patient presents to 

be follow significant alcohol intoxication weakness today patient no nausea 

vomiting intoxication currently.  He was no medical history takes no medications


MD Complaint: alcohol intoxication, alcohol dependence


Last Drink: just PTA


-: hour(s)


Previous Visits for Alcohol Intoxication?: Yes


Recent Trauma: Yes


Associated Symptoms: denies other symptoms


Treatments Prior to Arrival: none


Chronic Alcohol Use: Yes





- Related Data


                                Home Medications











 Medication  Instructions  Recorded  Confirmed


 


No Known Home Medications  04/02/18 11/13/21











                                    Allergies











Allergy/AdvReac Type Severity Reaction Status Date / Time


 


No Known Allergies Allergy   Verified 11/13/21 22:25














Review of Systems


ROS Statement: 


Those systems with pertinent positive or pertinent negative responses have been 

documented in the HPI.





ROS Other: All systems not noted in ROS Statement are negative.





Past Medical History


Past Medical History: No Reported History


History of Any Multi-Drug Resistant Organisms: None Reported


Past Surgical History: Unable to Obtain


Past Psychological History: No Psychological Hx Reported


Smoking Status: Current every day smoker


Past Alcohol Use History: Daily, Heavy


Past Drug Use History: None Reported





General Exam


General appearance: alert, in no apparent distress, anxious


Head exam: Present: atraumatic, normocephalic, normal inspection


Eye exam: Present: normal appearance, PERRL, EOMI.  Absent: scleral icterus, 

conjunctival injection, periorbital swelling


ENT exam: Present: normal exam, mucous membranes moist


Neck exam: Present: normal inspection.  Absent: tenderness, meningismus, 

lymphadenopathy


Respiratory exam: Present: normal lung sounds bilaterally.  Absent: respiratory 

distress, wheezes, rales, rhonchi, stridor


Cardiovascular Exam: Present: regular rate, normal rhythm, normal heart sounds. 

Absent: systolic murmur, diastolic murmur, rubs, gallop, clicks


GI/Abdominal exam: Present: soft, normal bowel sounds.  Absent: distended, 

tenderness, guarding, rebound, rigid


Extremities exam: Present: normal inspection, full ROM, normal capillary refill.

 Absent: tenderness, pedal edema, joint swelling, calf tenderness


Back exam: Present: normal inspection


Neurological exam: Present: alert, oriented X3, CN II-XII intact


Psychiatric exam: Present: normal affect, normal mood


Skin exam: Present: warm, dry, intact, normal color.  Absent: rash





Course


                                   Vital Signs











  11/13/21 11/13/21 11/14/21





  21:29 23:33 05:33


 


Temperature 98.2 F  


 


Pulse Rate 84 84 74


 


Respiratory 22 22 16





Rate   


 


Blood Pressure 169/109  156/84


 


O2 Sat by Pulse 99 96 96





Oximetry   














- Reevaluation(s)


Reevaluation #1: 





Medical records reviewed





Patient has significant improvement here in the emergency department





Patient informed results and questions answered





Reevaluation #2: 





Patient currently awake alert able ambulate without difficulty





Medical Decision Making





- Medical Decision Making





46 male to the emergency department for evaluation of severe alcohol 

intoxication.  Not homicidal or suicidal and: Without significant complaint.  

Patient to be discharged home





- Lab Data


Result diagrams: 


                                 11/14/21 00:34





                                 11/14/21 00:34


                                   Lab Results











  11/14/21 11/14/21 Range/Units





  00:34 00:34 


 


WBC  7.3   (3.8-10.6)  k/uL


 


RBC  4.95   (4.30-5.90)  m/uL


 


Hgb  15.9   (13.0-17.5)  gm/dL


 


Hct  48.7   (39.0-53.0)  %


 


MCV  98.5   (80.0-100.0)  fL


 


MCH  32.0   (25.0-35.0)  pg


 


MCHC  32.5   (31.0-37.0)  g/dL


 


RDW  13.5   (11.5-15.5)  %


 


Plt Count  289   (150-450)  k/uL


 


MPV  6.7   


 


Neutrophils %  44   %


 


Lymphocytes %  43   %


 


Monocytes %  4   %


 


Eosinophils %  5   %


 


Basophils %  1   %


 


Neutrophils #  3.2   (1.3-7.7)  k/uL


 


Lymphocytes #  3.1   (1.0-4.8)  k/uL


 


Monocytes #  0.3   (0-1.0)  k/uL


 


Eosinophils #  0.4   (0-0.7)  k/uL


 


Basophils #  0.1   (0-0.2)  k/uL


 


Sodium   141  (137-145)  mmol/L


 


Potassium   3.9  (3.5-5.1)  mmol/L


 


Chloride   104  ()  mmol/L


 


Carbon Dioxide   28  (22-30)  mmol/L


 


Anion Gap   9  mmol/L


 


BUN   5 L  (9-20)  mg/dL


 


Creatinine   0.56 L  (0.66-1.25)  mg/dL


 


Est GFR (CKD-EPI)AfAm   >90  (>60 ml/min/1.73 sqM)  


 


Est GFR (CKD-EPI)NonAf   >90  (>60 ml/min/1.73 sqM)  


 


Glucose   93  (74-99)  mg/dL


 


Calcium   8.6  (8.4-10.2)  mg/dL


 


Phosphorus   4.9 H  (2.5-4.5)  mg/dL


 


Magnesium   2.0  (1.6-2.3)  mg/dL


 


Total Bilirubin   0.2  (0.2-1.3)  mg/dL


 


AST   56  (17-59)  U/L


 


ALT   31  (4-49)  U/L


 


Alkaline Phosphatase   101  ()  U/L


 


Total Protein   7.4  (6.3-8.2)  g/dL


 


Albumin   4.2  (3.5-5.0)  g/dL


 


Lipase   279  ()  U/L


 


Serum Alcohol   321 H*  mg/dL














Disposition


Clinical Impression: 


 Alcoholic intoxication





Disposition: HOME SELF-CARE


Condition: Fair


Instructions (If sedation given, give patient instructions):  Alcohol 

Intoxication (ED)


Is patient prescribed a controlled substance at d/c from ED?: No


Referrals: 


None,Stated [Primary Care Provider] - 1-2 days

## 2021-11-14 VITALS — HEART RATE: 74 BPM | SYSTOLIC BLOOD PRESSURE: 156 MMHG | DIASTOLIC BLOOD PRESSURE: 84 MMHG | RESPIRATION RATE: 16 BRPM

## 2021-11-14 LAB
ALBUMIN SERPL-MCNC: 4.2 G/DL (ref 3.5–5)
ALP SERPL-CCNC: 101 U/L (ref 38–126)
ALT SERPL-CCNC: 31 U/L (ref 4–49)
ANION GAP SERPL CALC-SCNC: 9 MMOL/L
AST SERPL-CCNC: 56 U/L (ref 17–59)
BASOPHILS # BLD AUTO: 0.1 K/UL (ref 0–0.2)
BASOPHILS NFR BLD AUTO: 1 %
BUN SERPL-SCNC: 5 MG/DL (ref 9–20)
CALCIUM SPEC-MCNC: 8.6 MG/DL (ref 8.4–10.2)
CHLORIDE SERPL-SCNC: 104 MMOL/L (ref 98–107)
CO2 SERPL-SCNC: 28 MMOL/L (ref 22–30)
EOSINOPHIL # BLD AUTO: 0.4 K/UL (ref 0–0.7)
EOSINOPHIL NFR BLD AUTO: 5 %
ERYTHROCYTE [DISTWIDTH] IN BLOOD BY AUTOMATED COUNT: 4.95 M/UL (ref 4.3–5.9)
ERYTHROCYTE [DISTWIDTH] IN BLOOD: 13.5 % (ref 11.5–15.5)
GLUCOSE SERPL-MCNC: 93 MG/DL (ref 74–99)
HCT VFR BLD AUTO: 48.7 % (ref 39–53)
HGB BLD-MCNC: 15.9 GM/DL (ref 13–17.5)
LIPASE SERPL-CCNC: 279 U/L (ref 23–300)
LYMPHOCYTES # SPEC AUTO: 3.1 K/UL (ref 1–4.8)
LYMPHOCYTES NFR SPEC AUTO: 43 %
MAGNESIUM SPEC-SCNC: 2 MG/DL (ref 1.6–2.3)
MCH RBC QN AUTO: 32 PG (ref 25–35)
MCHC RBC AUTO-ENTMCNC: 32.5 G/DL (ref 31–37)
MCV RBC AUTO: 98.5 FL (ref 80–100)
MONOCYTES # BLD AUTO: 0.3 K/UL (ref 0–1)
MONOCYTES NFR BLD AUTO: 4 %
NEUTROPHILS # BLD AUTO: 3.2 K/UL (ref 1.3–7.7)
NEUTROPHILS NFR BLD AUTO: 44 %
PLATELET # BLD AUTO: 289 K/UL (ref 150–450)
POTASSIUM SERPL-SCNC: 3.9 MMOL/L (ref 3.5–5.1)
PROT SERPL-MCNC: 7.4 G/DL (ref 6.3–8.2)
SODIUM SERPL-SCNC: 141 MMOL/L (ref 137–145)
WBC # BLD AUTO: 7.3 K/UL (ref 3.8–10.6)

## 2022-04-16 ENCOUNTER — HOSPITAL ENCOUNTER (OUTPATIENT)
Dept: HOSPITAL 47 - EC | Age: 47
Setting detail: OBSERVATION
LOS: 2 days | Discharge: HOME | End: 2022-04-18
Attending: FAMILY MEDICINE | Admitting: FAMILY MEDICINE
Payer: COMMERCIAL

## 2022-04-16 VITALS — BODY MASS INDEX: 16.7 KG/M2

## 2022-04-16 DIAGNOSIS — F10.229: Primary | ICD-10-CM
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
## 2022-04-16 DIAGNOSIS — F32.A: ICD-10-CM

## 2022-04-16 DIAGNOSIS — Z71.9: ICD-10-CM

## 2022-04-16 DIAGNOSIS — E87.5: ICD-10-CM

## 2022-04-16 DIAGNOSIS — R00.0: ICD-10-CM

## 2022-04-16 DIAGNOSIS — F17.200: ICD-10-CM

## 2022-04-16 DIAGNOSIS — R45.850: ICD-10-CM

## 2022-04-16 DIAGNOSIS — Z71.41: ICD-10-CM

## 2022-04-16 DIAGNOSIS — R45.851: ICD-10-CM

## 2022-04-16 DIAGNOSIS — Z74.3: ICD-10-CM

## 2022-04-16 LAB
ANION GAP SERPL CALC-SCNC: 8 MMOL/L
BUN SERPL-SCNC: 7 MG/DL (ref 9–20)
CALCIUM SPEC-MCNC: 8.6 MG/DL (ref 8.4–10.2)
CHLORIDE SERPL-SCNC: 105 MMOL/L (ref 98–107)
CO2 SERPL-SCNC: 31 MMOL/L (ref 22–30)
ERYTHROCYTE [DISTWIDTH] IN BLOOD BY AUTOMATED COUNT: 4.91 M/UL (ref 4.3–5.9)
ERYTHROCYTE [DISTWIDTH] IN BLOOD: 12.8 % (ref 11.5–15.5)
GLUCOSE SERPL-MCNC: 98 MG/DL (ref 74–99)
HCT VFR BLD AUTO: 50.8 % (ref 39–53)
HGB BLD-MCNC: 16.1 GM/DL (ref 13–17.5)
MCH RBC QN AUTO: 32.8 PG (ref 25–35)
MCHC RBC AUTO-ENTMCNC: 31.6 G/DL (ref 31–37)
MCV RBC AUTO: 103.5 FL (ref 80–100)
PLATELET # BLD AUTO: 259 K/UL (ref 150–450)
POTASSIUM SERPL-SCNC: 5.3 MMOL/L (ref 3.5–5.1)
SODIUM SERPL-SCNC: 144 MMOL/L (ref 137–145)
WBC # BLD AUTO: 10.6 K/UL (ref 3.8–10.6)

## 2022-04-16 PROCEDURE — 85025 COMPLETE CBC W/AUTO DIFF WBC: CPT

## 2022-04-16 PROCEDURE — 80320 DRUG SCREEN QUANTALCOHOLS: CPT

## 2022-04-16 PROCEDURE — 80306 DRUG TEST PRSMV INSTRMNT: CPT

## 2022-04-16 PROCEDURE — 36415 COLL VENOUS BLD VENIPUNCTURE: CPT

## 2022-04-16 PROCEDURE — 80048 BASIC METABOLIC PNL TOTAL CA: CPT

## 2022-04-16 PROCEDURE — 96372 THER/PROPH/DIAG INJ SC/IM: CPT

## 2022-04-16 PROCEDURE — 96360 HYDRATION IV INFUSION INIT: CPT

## 2022-04-16 PROCEDURE — 99285 EMERGENCY DEPT VISIT HI MDM: CPT

## 2022-04-16 PROCEDURE — 85027 COMPLETE CBC AUTOMATED: CPT

## 2022-04-16 RX ADMIN — CEFAZOLIN SCH MLS/HR: 330 INJECTION, POWDER, FOR SOLUTION INTRAMUSCULAR; INTRAVENOUS at 21:21

## 2022-04-16 NOTE — ED
General Adult HPI





- General


Chief complaint: Alcohol


Stated complaint: ETOH


Time Seen by Provider: 04/16/22 18:20


Source: patient, EMS, RN notes reviewed, old records reviewed


Mode of arrival: EMS


Limitations: altered mental status





- History of Present Illness


Initial comments: 





Patient is a 46 female who presents emergency Department complaining of alcohol 

intoxication as well as suicidal ideation.  Was found wandering around and 

called EMS.  States he has been having thoughts when her himself.  When I 

discussed this with the patient, he does endorse thoughts point hurt himself but

denies any attempts or plans.  States she is mad at his sister has had thoughts 

of going to hurt her.  Denies any visual or auditory hallucinations.  Denies any

history of alcohol withdrawal.  Endorses drinking over a fifth of alcohol this 

evening.  Denies any other drug use other than tobacco.  Denies any chest pain, 

shortness breath, abdominal pain, nausea, vomiting.  His no other acute 

complaint at this time.  Presents for acute intoxication as well as suicidal 

ideations.





- Related Data


                                Home Medications











 Medication  Instructions  Recorded  Confirmed


 


No Known Home Medications  04/02/18 04/16/22











                                    Allergies











Allergy/AdvReac Type Severity Reaction Status Date / Time


 


No Known Allergies Allergy   Verified 04/16/22 18:29














Review of Systems


ROS Statement: 


Those systems with pertinent positive or pertinent negative responses have been 

documented in the HPI.


Review of Systems:


CONST: Denies fever 


EYES: Denies blurry vision 


ENT: Denies nasal congestion  


C/V:  Denies Chest pain


RESP: Denies shortness of breath 


GI: Denies abdominal pain 


: Denies dysuria  


SKIN: Denies rash.


MSK: Denies joint pain.


NEURO: Denies headache 


PSYCH: Denies homicidal ideations/plans/attempts.  Denies visual or auditory 

hallucinations.  He endorses suicidal ideations.  Denies suicide plans or 

attempts.


ROS Other: All systems not noted in ROS Statement are negative.





Past Medical History


Past Medical History: No Reported History


History of Any Multi-Drug Resistant Organisms: None Reported


Past Surgical History: Unable to Obtain


Past Psychological History: No Psychological Hx Reported


Smoking Status: Current every day smoker


Past Alcohol Use History: Daily, Heavy


Past Drug Use History: None Reported





General Exam


Limitations: altered mental status





Course


                                   Vital Signs











  04/16/22 04/16/22 04/16/22





  18:07 19:49 21:15


 


Temperature 98.7 F  


 


Pulse Rate 76 84 78


 


Respiratory 17 18 18





Rate   


 


Blood Pressure 132/80 132/80 138/76


 


O2 Sat by Pulse 99 97 97





Oximetry   














Medical Decision Making





- Medical Decision Making





Based on patient's presentation and physical exam, I do believe he is acutely 

intoxicated with alcohol.  Is also having suicidal ideations.  He will be placed

in green scrubs.  Suicide precautions were ordered.  Sitter was ordered.  We 

will obtain a UDS as well as alcohol level and basic labs.  He'll be given IV 

fluids.  He was in agreement this plan.  CIWA protocol is ordered.





Patient's lavatory studies are remarkable for a slightly elevated potassium at 

5.3 for which he is receiving IV fluids.  UDS was negative.  Alcohol level is 

357.  Remainder of the labs are unremarkable.





On reevaluation, patient remains stable.  Vital signs are within normal limits. 

I updated him that he will be admitted where psychiatry will then evaluate the 

patient due to his acute alcohol intoxication.  He was in agreement this plan.  

I spoke with the admitting physician, Dr. Covington who accepted the admission.  

Psychiatry was consulted.





- Lab Data


Result diagrams: 


                                 04/16/22 18:40





                                 04/16/22 18:40


                                   Lab Results











  04/16/22 04/16/22 04/16/22 Range/Units





  18:38 18:40 18:40 


 


WBC   10.6   (3.8-10.6)  k/uL


 


RBC   4.91   (4.30-5.90)  m/uL


 


Hgb   16.1   (13.0-17.5)  gm/dL


 


Hct   50.8   (39.0-53.0)  %


 


MCV   103.5 H   (80.0-100.0)  fL


 


MCH   32.8   (25.0-35.0)  pg


 


MCHC   31.6   (31.0-37.0)  g/dL


 


RDW   12.8   (11.5-15.5)  %


 


Plt Count   259   (150-450)  k/uL


 


MPV   7.2   


 


Macrocytosis   Slight   


 


Sodium    144  (137-145)  mmol/L


 


Potassium    5.3 H  (3.5-5.1)  mmol/L


 


Chloride    105  ()  mmol/L


 


Carbon Dioxide    31 H  (22-30)  mmol/L


 


Anion Gap    8  mmol/L


 


BUN    7 L  (9-20)  mg/dL


 


Creatinine    0.69  (0.66-1.25)  mg/dL


 


Est GFR (CKD-EPI)AfAm    >90  (>60 ml/min/1.73 sqM)  


 


Est GFR (CKD-EPI)NonAf    >90  (>60 ml/min/1.73 sqM)  


 


Glucose    98  (74-99)  mg/dL


 


Calcium    8.6  (8.4-10.2)  mg/dL


 


Urine Opiates Screen  Not Detected    (NotDetected)  


 


Ur Oxycodone Screen  Not Detected    (NotDetected)  


 


Urine Methadone Screen  Not Detected    (NotDetected)  


 


Ur Propoxyphene Screen  Not Detected    (NotDetected)  


 


Ur Barbiturates Screen  Not Detected    (NotDetected)  


 


U Tricyclic Antidepress  Not Detected    (NotDetected)  


 


Ur Phencyclidine Scrn  Not Detected    (NotDetected)  


 


Ur Amphetamines Screen  Not Detected    (NotDetected)  


 


U Methamphetamines Scrn  Not Detected    (NotDetected)  


 


U Benzodiazepines Scrn  Not Detected    (NotDetected)  


 


Urine Cocaine Screen  Not Detected    (NotDetected)  


 


U Marijuana (THC) Screen  Not Detected    (NotDetected)  


 


Serum Alcohol    357 H*  mg/dL














Disposition


Clinical Impression: 


 Alcohol intoxication, Suicidal ideations





Disposition: ADMITTED AS IP TO THIS HOSP


Condition: Stable

## 2022-04-17 LAB
ANION GAP SERPL CALC-SCNC: 21.8 MMOL/L (ref 10–18)
BASOPHILS # BLD AUTO: 0.11 X 10*3/UL (ref 0–0.1)
BASOPHILS NFR BLD AUTO: 1.3 %
BUN SERPL-SCNC: 7.7 MG/DL (ref 9–27)
BUN/CREAT SERPL: 11 RATIO (ref 12–20)
CALCIUM SPEC-MCNC: 8.7 MG/DL (ref 8.7–10.3)
CHLORIDE SERPL-SCNC: 104 MMOL/L (ref 96–109)
CO2 SERPL-SCNC: 14.2 MMOL/L (ref 20–27.5)
EOSINOPHIL # BLD AUTO: 0.21 X 10*3/UL (ref 0.04–0.35)
EOSINOPHIL NFR BLD AUTO: 2.4 %
ERYTHROCYTE [DISTWIDTH] IN BLOOD BY AUTOMATED COUNT: 4.48 X 10*6/UL (ref 4.4–5.6)
ERYTHROCYTE [DISTWIDTH] IN BLOOD: 13 % (ref 11.5–14.5)
GLUCOSE SERPL-MCNC: 95 MG/DL (ref 70–110)
HCT VFR BLD AUTO: 44.5 % (ref 39.6–50)
HGB BLD-MCNC: 14.7 G/DL (ref 13–17)
IMM GRANULOCYTES BLD QL AUTO: 0.2 %
LYMPHOCYTES # SPEC AUTO: 2.58 X 10*3/UL (ref 0.9–5)
LYMPHOCYTES NFR SPEC AUTO: 29.5 %
MCH RBC QN AUTO: 32.8 PG (ref 27–32)
MCHC RBC AUTO-ENTMCNC: 33 G/DL (ref 32–37)
MCV RBC AUTO: 99.3 FL (ref 80–97)
MONOCYTES # BLD AUTO: 0.54 X 10*3/UL (ref 0.2–1)
MONOCYTES NFR BLD AUTO: 6.2 %
NEUTROPHILS # BLD AUTO: 5.3 X 10*3/UL (ref 1.8–7.7)
NEUTROPHILS NFR BLD AUTO: 60.4 %
NRBC BLD AUTO-RTO: 0 /100 WBCS (ref 0–0)
PLATELET # BLD AUTO: 243 X 10*3/UL (ref 140–440)
POTASSIUM SERPL-SCNC: 4.6 MMOL/L (ref 3.5–5.5)
SODIUM SERPL-SCNC: 140 MMOL/L (ref 135–145)
WBC # BLD AUTO: 8.76 X 10*3/UL (ref 4.5–10)

## 2022-04-17 RX ADMIN — Medication SCH MG: at 16:21

## 2022-04-17 RX ADMIN — CEFAZOLIN SCH: 330 INJECTION, POWDER, FOR SOLUTION INTRAMUSCULAR; INTRAVENOUS at 16:07

## 2022-04-17 RX ADMIN — Medication SCH MG: at 08:20

## 2022-04-17 RX ADMIN — CEFAZOLIN SCH: 330 INJECTION, POWDER, FOR SOLUTION INTRAMUSCULAR; INTRAVENOUS at 08:20

## 2022-04-17 NOTE — P.CN
Psychiatric Consult





- .


Consult:: 


IDENTIFYING DATA: Patient is a 46-year-old  male who is admitted for 

alcohol detox in psychiatry is consulted for suicidal ideations





HPI: States that he is here because "I was drinking, I must have flipped out" 

states that he usually drinks a few beers every other day, and that prior to 

hospitalization he drank half a pint at least.  States that he drank because he 

was bored at home after having the weekend off.  States that his sister told him

that he could not come back home unless he stopped drinking.  States that he was

9 months sober, and then he relapsed after his mom passed away.  States that he 

was on Antabuse in the past which worked for him.  He denies current or past 

suicidal ideations, homicidal ideations, suicide attempts, family history of 

suicide attempts, hallucinations, access to guns or weapons, olivia, significant 

depression.





PAST PSYCHIATRIC HISTORY: States that he was on Antabuse in the past.  Denies 

any other psychiatric history





PMH:[denies]





ALLERGIES: as per EMR





CHEMICAL DEPENDENCY HISTORY: as per HPI





FAMILY PSYCHIATRIC/SUBSTANCE USE HISTORY:  Sister also drinks alcohol





SOCIAL HISTORY: Lives with his sister.  No legal issues.  Works full-time





MENTAL STATUS EXAM: 


General Appearance: 46-year-old  male who appears older than stated 

age.  Balding brown hair with beard.  Tattoo on left arm.  Dressed appropriately

 in hospital gown.  Lying in bed


Behavior: Cooperative


Speech: Patient's speech is [fluent and nonpressured.]


Mood/Affect: Patient reports their mood is "sleeping all day", affect is full 

range, he smiled and laughed several times during the interview


Suicidality/Homicidality:  Patient denies having any homicidal ideation intent 

or plan. [Denies any suicidal ideations intent or plan]  


Perceptions: Patient denies any visual hallucinations [and denies any auditory 

hallucinations]


Though content/process: [There is no evidence of any delusional thought content 

and thought process is linear and goal-directed.] 


Memory and concentration: AOX3


Judgment and insight: Impaired








IMPRESSIONS: 


Meets criteria for alcohol use disorder.  He likely made the suicidal statement 

while he was intoxicated.  On interview today, he has full range of affect, is 

not suicidal, and objectively does not show signs of depression.  Moreover, he 

has multiple protective factors for suicide including lack of suicide attempt, 

lack of family history of suicide attempt, lack of access to guns or weapons, 

and having supports in his life.





PLAN: 


-DC sitter - suicidal statement was likely given while intoxicated, current not 

suicidal, does not appear to be depressed objectively, not a risk of harm to 

self


-encouraged to seek treatment for alcohol use via medications (antabuse, 

naltrexone, vivitrol, acamprosate) and therapy


-psychiatry will sign off


04/17/22 13:00

## 2022-04-18 VITALS — TEMPERATURE: 98.5 F | SYSTOLIC BLOOD PRESSURE: 132 MMHG | HEART RATE: 66 BPM | DIASTOLIC BLOOD PRESSURE: 69 MMHG

## 2022-04-18 VITALS — RESPIRATION RATE: 18 BRPM

## 2022-04-18 RX ADMIN — CEFAZOLIN SCH: 330 INJECTION, POWDER, FOR SOLUTION INTRAMUSCULAR; INTRAVENOUS at 01:32

## 2022-04-18 RX ADMIN — Medication SCH MG: at 08:24

## 2022-04-18 NOTE — PN
PROGRESS NOTE



DATE OF SERVICE:

04/17/2022



CHIEF COMPLAINT:

Acute alcohol intoxication.



HISTORY OF PRESENT ILLNESS:

This patient still remains very lethargic.  He has made no threatening statements,

however.



PHYSICAL EXAMINATION:

His vital signs are normal.  The chest is clear.  Cardiac exam is normal.  The abdomen

is soft and nontender. He is not tremulous.



IMPRESSION:

1. Acute alcohol intoxication.

2. History of talking about suicide and homicide.



PLAN:

Continue with monitoring.  The psychiatrist has seen him and feels that he is safe and

not suicidal or homicidal.





MMODL / IJN: [id number] / Job#: [id number]

## 2022-04-18 NOTE — HP
HISTORY AND PHYSICAL



DATE OF SERVICE:

04/16/2022



CHIEF COMPLAINT:

Acute alcohol intoxication, depression, and homicidal and suicidal statements.



HISTORY OF PRESENT ILLNESS:

This 46-year-old male was brought to the emergency room extremely intoxicated.  He

apparently had had some difficulty with a girlfriend and was talking about suicide and

homicide.



Review of systems is unobtainable because of his inebriated state.  He was last seen in

the office in 2020, and at that time he was on no medications.  He apparently is a

smoker.



The remainder of his history is unremarkable and noncontributory.  He cannot give any

contemporary history.



PHYSICAL EXAMINATION:

Blood pressure is 145/100 with a pulse of 86 and regular, respirations of 35.  He is

afebrile. In general he appeared to be very lethargic.  Skin color was normal.  Head,

ears, eyes, nose, mouth and throat appeared to be normal.  Pupils were equally round.

Chest was clear.  Cardiac exam demonstrated sinus tachycardia.  The abdomen was soft

and nontender.  There were no masses.  Extremities were normal. Neurologically, other

than being intoxicated, he was intact.



He is admitted to the hospital with the diagnoses:

1. Acute alcohol intoxication.

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



PLAN:

1. Bedrest.

2. IV fluids.

3. CIWA protocol.

4. Suicide precautions.





MMODL / IJN: [id number] / Job#: [id number]

## 2022-04-18 NOTE — DS
DISCHARGE SUMMARY



CHIEF COMPLAINT:

Acute alcohol intoxication and expression of homicidal and suicidal thoughts.



HISTORY OF PRESENT ILLNESS AND PHYSICAL EXAMINATION:

Details of this man's history and physical can be found in the initial workup.



LABORATORY STUDIES:

While he was in the hospital he had laboratory studies, details of which can be found

in the laboratory section of his chart.



COURSE IN THE HOSPITAL:

After admission he was placed on bedrest, started on intravenous fluids and CIWA

protocol.  He remained intoxicated for over 24 hours but then became more awake and

alert.  He was seen by Psychiatry, who deemed that he was not homicidal or suicidal.

He was doing well it was felt that he could be discharged and he will go home on

Campral 333 mg three times a day and be seen in the office in the next day or two.



FINAL DIAGNOSIS:

1. Acute alcohol intoxication.

2. Chronic alcoholism.

3. Expression of suicidal and homicidal thoughts.



OPERATIONS:

None.



CONSULTATION:

Psychiatry.



He is improved.





MMJASON / PERCY: [id number] / Job#: [id number]

## 2022-05-14 ENCOUNTER — HOSPITAL ENCOUNTER (EMERGENCY)
Dept: HOSPITAL 47 - EC | Age: 47
LOS: 1 days | Discharge: HOME | End: 2022-05-15
Payer: COMMERCIAL

## 2022-05-14 DIAGNOSIS — F17.200: ICD-10-CM

## 2022-05-14 DIAGNOSIS — F10.129: Primary | ICD-10-CM

## 2022-05-14 LAB
ALBUMIN SERPL-MCNC: 4.6 G/DL (ref 3.5–5)
ALP SERPL-CCNC: 74 U/L (ref 38–126)
ALT SERPL-CCNC: 18 U/L (ref 4–49)
ANION GAP SERPL CALC-SCNC: 13 MMOL/L
AST SERPL-CCNC: 31 U/L (ref 17–59)
BASOPHILS # BLD AUTO: 0.1 K/UL (ref 0–0.2)
BASOPHILS NFR BLD AUTO: 1 %
BUN SERPL-SCNC: 7 MG/DL (ref 9–20)
CALCIUM SPEC-MCNC: 8.5 MG/DL (ref 8.4–10.2)
CHLORIDE SERPL-SCNC: 109 MMOL/L (ref 98–107)
CO2 SERPL-SCNC: 23 MMOL/L (ref 22–30)
EOSINOPHIL # BLD AUTO: 0.3 K/UL (ref 0–0.7)
EOSINOPHIL NFR BLD AUTO: 4 %
ERYTHROCYTE [DISTWIDTH] IN BLOOD BY AUTOMATED COUNT: 4.32 M/UL (ref 4.3–5.9)
ERYTHROCYTE [DISTWIDTH] IN BLOOD: 12.4 % (ref 11.5–15.5)
GLUCOSE SERPL-MCNC: 81 MG/DL (ref 74–99)
HCT VFR BLD AUTO: 44 % (ref 39–53)
HGB BLD-MCNC: 14.2 GM/DL (ref 13–17.5)
LYMPHOCYTES # SPEC AUTO: 3.2 K/UL (ref 1–4.8)
LYMPHOCYTES NFR SPEC AUTO: 44 %
MCH RBC QN AUTO: 32.9 PG (ref 25–35)
MCHC RBC AUTO-ENTMCNC: 32.3 G/DL (ref 31–37)
MCV RBC AUTO: 101.9 FL (ref 80–100)
MONOCYTES # BLD AUTO: 0.2 K/UL (ref 0–1)
MONOCYTES NFR BLD AUTO: 3 %
NEUTROPHILS # BLD AUTO: 3.4 K/UL (ref 1.3–7.7)
NEUTROPHILS NFR BLD AUTO: 46 %
PLATELET # BLD AUTO: 211 K/UL (ref 150–450)
POTASSIUM SERPL-SCNC: 4.3 MMOL/L (ref 3.5–5.1)
PROT SERPL-MCNC: 7.4 G/DL (ref 6.3–8.2)
SODIUM SERPL-SCNC: 145 MMOL/L (ref 137–145)
WBC # BLD AUTO: 7.3 K/UL (ref 3.8–10.6)

## 2022-05-14 PROCEDURE — 82075 ASSAY OF BREATH ETHANOL: CPT

## 2022-05-14 PROCEDURE — 99284 EMERGENCY DEPT VISIT MOD MDM: CPT

## 2022-05-14 PROCEDURE — 80053 COMPREHEN METABOLIC PANEL: CPT

## 2022-05-14 PROCEDURE — 36415 COLL VENOUS BLD VENIPUNCTURE: CPT

## 2022-05-14 PROCEDURE — 85025 COMPLETE CBC W/AUTO DIFF WBC: CPT

## 2022-05-14 PROCEDURE — 80320 DRUG SCREEN QUANTALCOHOLS: CPT

## 2022-05-14 PROCEDURE — 96360 HYDRATION IV INFUSION INIT: CPT

## 2022-05-14 NOTE — ED
General Adult HPI





<LauryAllen - Last Filed: 05/15/22 06:32>





- General


Source: patient, EMS


Mode of arrival: EMS


Limitations: no limitations





<Frederick Wilkerson - Last Filed: 05/15/22 16:13>





- General


Chief complaint: Alcohol


Stated complaint: ETOH


Time Seen by Provider: 05/14/22 21:08





- History of Present Illness


Initial comments: 


Patient presents to the ED by ambulance for evaluation.  Patient states that he 

"passed out" at his friend's residence after having "one too many beers", and he

states that his friend must have called for an ambulance.  Patient cannot tell 

me exactly many beers he has had to drink today, but he states that he was only 

drinking beers today.  Patient denies medication abuse or overdose.  Patient 

denies illicit drug use.  Patient denies trauma or injury, and he denies having 

any pain.  Patient denies fever or chills, headache, focal neuro deficit, 

neck/back/external pain, chest pain or pressure, dyspnea, cough or cold 

symptoms, palpitations, dizziness, abdominal pain, nausea/vomiting/diarrhea, 

dysuria or urinary symptoms, or any other symptoms or complaints.


 (Frederick Wilkerson)





- Related Data


                                  Previous Rx's











 Medication  Instructions  Recorded


 


Acamprosate Calcium [Campral] 333 mg PO TID #30 tablet 04/18/22


 


Thiamine [Vitamin B-1] 100 mg PO BID-W/MEALS #60 tab 04/18/22











                                    Allergies











Allergy/AdvReac Type Severity Reaction Status Date / Time


 


No Known Allergies Allergy   Verified 05/14/22 21:54














Review of Systems


ROS Other: All systems not noted in ROS Statement are negative.





<Allen Schaeffer - Last Filed: 05/15/22 06:32>


ROS Other: All systems not noted in ROS Statement are negative.





<Frederick Wilkerson - Last Filed: 05/15/22 16:13>


ROS Statement: 


Those systems with pertinent positive or pertinent negative responses have been 

documented in the HPI.








Past Medical History


Past Medical History: No Reported History, CVA/TIA, Hearing Disorder / Deafness,

Myocardial Infarction (MI)


Additional Past Medical History / Comment(s): patient states he's had 2 strokes 

and 2 heart attacks (5/6 months ago) States he has never followed up with any 

physicians.right sided hearing loss


Last Myocardial Infarction Date:: unknown


History of Any Multi-Drug Resistant Organisms: None Reported


Past Surgical History: Unable to Obtain


Past Anesthesia/Blood Transfusion Reactions: No Reported Reaction


Past Psychological History: No Psychological Hx Reported


Smoking Status: Current every day smoker


Past Alcohol Use History: Daily, Heavy


Past Drug Use History: None Reported





- Past Family History


  ** Mother


Family Medical History: Cancer


Additional Family Medical History / Comment(s): breast and ovarian





<Frederick Wilkerson - Last Filed: 05/15/22 16:13>





General Exam


Limitations: no limitations


General appearance: alert, other (Patient appears intoxicated and smells of 

alcohol)


Head exam: Present: atraumatic, normocephalic


Eye exam: Present: PERRL, EOMI


ENT exam: Present: mucous membranes moist


Neck exam: Present: other (Trachea is in midline).  Absent: tenderness


Respiratory exam: Present: normal lung sounds bilaterally.  Absent: respiratory 

distress, wheezes, rales, rhonchi, stridor


Cardiovascular Exam: Present: regular rate, normal rhythm, normal heart sounds, 

other (Normal radial pulses bilaterally)


GI/Abdominal exam: Present: soft.  Absent: distended, tenderness, guarding


Extremities exam: Absent: tenderness, pedal edema, calf tenderness


Back exam: Present: normal inspection.  Absent: tenderness


Neurological exam: Present: alert, oriented X3, CN II-XII intact.  Absent: motor

sensory deficit


Skin exam: Present: warm, dry, intact, normal color





<Frederick Wilkerson - Last Filed: 05/15/22 16:13>





Course





<Frederick Wilkerson - Last Filed: 05/15/22 16:13>


                                   Vital Signs











  05/14/22 05/15/22 05/15/22





  22:00 03:47 06:00


 


Pulse Rate 74 75 72


 


Respiratory 16 18 





Rate   


 


Blood Pressure 102/68 116/88 112/68


 


O2 Sat by Pulse 94 L 96 





Oximetry   














- Reevaluation(s)


Reevaluation #1: 





05/14/22 23:04


Patient remains easily arousable and breathing comfortably.  Other than an 

elevated alcohol level of 267, patient's labs are fairly unremarkable.  Patient 

was endorsed to Dr. Schaeffer (ED physician) due to end of my shift.  Dr. Schaeffer 

has agreed to watch the patient in the ED until he has sobered up more.  Dr. Schaeffer will take over care of the patient at this time. (Frederick Wilkerson)





Medical Decision Making





- Lab Data


Result diagrams: 


                                 05/14/22 22:00





                                 05/14/22 22:00





<Allen Schaeffer - Last Filed: 05/15/22 06:32>





- Lab Data


Result diagrams: 


                                 05/14/22 22:00





                                 05/14/22 22:00





<Frederick Wilkerson - Last Filed: 05/15/22 16:13>





- Lab Data


                                   Lab Results











  05/14/22 05/14/22 Range/Units





  22:00 22:00 


 


WBC  7.3   (3.8-10.6)  k/uL


 


RBC  4.32   (4.30-5.90)  m/uL


 


Hgb  14.2   (13.0-17.5)  gm/dL


 


Hct  44.0   (39.0-53.0)  %


 


MCV  101.9 H   (80.0-100.0)  fL


 


MCH  32.9   (25.0-35.0)  pg


 


MCHC  32.3   (31.0-37.0)  g/dL


 


RDW  12.4   (11.5-15.5)  %


 


Plt Count  211   (150-450)  k/uL


 


MPV  7.3   


 


Neutrophils %  46   %


 


Lymphocytes %  44   %


 


Monocytes %  3   %


 


Eosinophils %  4   %


 


Basophils %  1   %


 


Neutrophils #  3.4   (1.3-7.7)  k/uL


 


Lymphocytes #  3.2   (1.0-4.8)  k/uL


 


Monocytes #  0.2   (0-1.0)  k/uL


 


Eosinophils #  0.3   (0-0.7)  k/uL


 


Basophils #  0.1   (0-0.2)  k/uL


 


Sodium   145  (137-145)  mmol/L


 


Potassium   4.3  (3.5-5.1)  mmol/L


 


Chloride   109 H  ()  mmol/L


 


Carbon Dioxide   23  (22-30)  mmol/L


 


Anion Gap   13  mmol/L


 


BUN   7 L  (9-20)  mg/dL


 


Creatinine   0.67  (0.66-1.25)  mg/dL


 


Est GFR (CKD-EPI)AfAm   >90  (>60 ml/min/1.73 sqM)  


 


Est GFR (CKD-EPI)NonAf   >90  (>60 ml/min/1.73 sqM)  


 


Glucose   81  (74-99)  mg/dL


 


Calcium   8.5  (8.4-10.2)  mg/dL


 


Total Bilirubin   0.4  (0.2-1.3)  mg/dL


 


AST   31  (17-59)  U/L


 


ALT   18  (4-49)  U/L


 


Alkaline Phosphatase   74  ()  U/L


 


Total Protein   7.4  (6.3-8.2)  g/dL


 


Albumin   4.6  (3.5-5.0)  g/dL


 


Serum Alcohol   267 H*  mg/dL














Disposition


Is patient prescribed a controlled substance at d/c from ED?: No





<Allen Schaeffer - Last Filed: 05/15/22 06:32>


Is patient prescribed a controlled substance at d/c from ED?: No





<Frdeerick Wilkerson - Last Filed: 05/15/22 16:13>


Clinical Impression: 


 Alcohol intoxication





Disposition: HOME SELF-CARE


Condition: Good


Instructions (If sedation given, give patient instructions):  Alcohol 

Intoxication (ED)


Referrals: 


Ethan Covington MD [Primary Care Provider] - 1-2 days

## 2022-05-15 VITALS — DIASTOLIC BLOOD PRESSURE: 68 MMHG | HEART RATE: 72 BPM | SYSTOLIC BLOOD PRESSURE: 112 MMHG

## 2022-05-15 VITALS — RESPIRATION RATE: 18 BRPM

## 2023-07-03 ENCOUNTER — HOSPITAL ENCOUNTER (EMERGENCY)
Dept: HOSPITAL 47 - EC | Age: 48
LOS: 1 days | Discharge: HOME | End: 2023-07-04
Payer: COMMERCIAL

## 2023-07-03 VITALS — RESPIRATION RATE: 16 BRPM

## 2023-07-03 DIAGNOSIS — I25.2: ICD-10-CM

## 2023-07-03 DIAGNOSIS — S12.9XXA: ICD-10-CM

## 2023-07-03 DIAGNOSIS — F10.129: ICD-10-CM

## 2023-07-03 DIAGNOSIS — F17.200: ICD-10-CM
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
## 2023-07-03 DIAGNOSIS — S42.302A: Primary | ICD-10-CM

## 2023-07-03 PROCEDURE — 96372 THER/PROPH/DIAG INJ SC/IM: CPT

## 2023-07-03 PROCEDURE — 73030 X-RAY EXAM OF SHOULDER: CPT

## 2023-07-03 PROCEDURE — 70450 CT HEAD/BRAIN W/O DYE: CPT

## 2023-07-03 PROCEDURE — 99285 EMERGENCY DEPT VISIT HI MDM: CPT

## 2023-07-03 NOTE — CT
EXAM:

  CT Head Without Intravenous Contrast

 

CLINICAL HISTORY:

  ITS.REASON CT Reason: fall injury

 

TECHNIQUE:

  Axial computed tomography images of the head/brain without intravenous 

contrast.  CTDI is 49.2 mGy and DLP is 1158.4 mGy-cm.  This CT exam was 

performed using one or more of the following dose reduction techniques: 

automated exposure control, adjustment of the mA and/or kV according to 

patient size, and/or use of iterative reconstruction technique.

 

COMPARISON:

  No relevant prior studies available.

 

FINDINGS: 

No acute intracranial hemorrhage.  No midline shift or mass effect. 

 

The territorial gray-white matter differentiation is maintained 

throughout. 

 

The ventricles and sulci are commensurate with age.

 

The visualized orbits appear grossly unremarkable. 

 

The calvarium is intact.

 

The visualized paranasal sinuses and mastoid air cells are grossly clear.

 

IMPRESSION:

No acute intracranial hemorrhage, midline shift, or mass effect.

## 2023-07-03 NOTE — XR
EXAM:

  XR Left Shoulder Complete, 2 or More Views

 

CLINICAL HISTORY:

  ITS.REASON XR Reason: injury

 

TECHNIQUE:

  Two or more views of the left shoulder.

 

COMPARISON:

  No relevant prior studies available.

 

FINDINGS:

  Bones/joints:  Displaced fracture of the LEFT proximal humerus surgical 

neck.  Old, nonunited fracture of the LEFT distal clavicle.  No 

dislocation.

  Soft tissues:  Unremarkable.

 

IMPRESSION:     

  Displaced fracture of the LEFT proximal humerus surgical neck.

## 2023-07-03 NOTE — ED
General Adult HPI





- General


Chief complaint: Alcohol


Stated complaint: ETOH


Time Seen by Provider: 07/03/23 22:14


Source: patient, EMS


Mode of arrival: EMS


Limitations: no limitations





- History of Present Illness


Initial comments: 





This patient is 47-year-old man brought by ambulance to have evaluation after 

reportedly having fallen.  The patient admits to drinking alcohol.  He does not 

remember the mechanism of injury, but states that his shoulder hurts.  He 

indicates the left shoulder/upper humerus.  Complains of decreased range of 

motion.  Denies weakness/numbness


-: minutes(s)


Location: left, upper extremity


Quality: sharp


Consistency: constant


Improves with: immobilization


Worsens with: movement


Associated Symptoms: denies other symptoms


Treatments Prior to Arrival: none





- Related Data


                                  Previous Rx's











 Medication  Instructions  Recorded


 


Acamprosate Calcium [Campral] 333 mg PO TID #30 tablet 04/18/22


 


Thiamine [Vitamin B-1] 100 mg PO BID-W/MEALS #60 tab 04/18/22


 


Acetaminophen-Codeine 300-30mg 1 tab PO Q4H PRN #16 tablet 07/04/23





[Tylenol w/codeine #3]  


 


Ibuprofen [Motrin] 600 mg PO Q8HR PRN #20 tab 07/04/23











                                    Allergies











Allergy/AdvReac Type Severity Reaction Status Date / Time


 


No Known Allergies Allergy   Verified 05/14/22 21:54














Review of Systems


ROS Statement: 


Those systems with pertinent positive or pertinent negative responses have been 

documented in the HPI.





ROS Other: All systems not noted in ROS Statement are negative.


Constitutional: Denies: weakness


Eyes: Denies: vision change


Respiratory: Denies: cough, dyspnea


Cardiovascular: Denies: chest pain, palpitations


Gastrointestinal: Denies: abdominal pain, vomiting


Musculoskeletal: Reports: as per HPI, arthralgia.  Denies: back pain


Skin: Denies: rash


Neurological: Denies: headache, weakness





Past Medical History


Past Medical History: No Reported History, CVA/TIA, Hearing Disorder / Deafness,

 Myocardial Infarction (MI)


Additional Past Medical History / Comment(s): patient states he's had 2 strokes 

and 2 heart attacks (5/6 months ago) States he has never followed up with any 

physicians.right sided hearing loss


Last Myocardial Infarction Date:: unknown


History of Any Multi-Drug Resistant Organisms: None Reported


Past Surgical History: Unable to Obtain


Past Anesthesia/Blood Transfusion Reactions: No Reported Reaction


Past Psychological History: No Psychological Hx Reported


Smoking Status: Current every day smoker


Past Alcohol Use History: Daily, Heavy


Past Drug Use History: None Reported





- Past Family History


  ** Mother


Family Medical History: Cancer


Additional Family Medical History / Comment(s): breast and ovarian





General Exam


Limitations: no limitations


General appearance: alert, appears intoxicated


Head exam: Present: atraumatic, normocephalic


Eye exam: Present: normal appearance.  Absent: scleral icterus, conjunctival 

injection


Neck exam: Present: normal inspection, full ROM.  Absent: tenderness


Respiratory exam: Present: normal lung sounds bilaterally.  Absent: respiratory 

distress, wheezes, rales, rhonchi, stridor


Cardiovascular Exam: Present: regular rate, normal rhythm, normal heart sounds. 

 Absent: systolic murmur, diastolic murmur, rubs, gallop


GI/Abdominal exam: Present: soft.  Absent: distended, tenderness, guarding, 

rebound, rigid, mass


Extremities exam: Present: normal inspection, normal capillary refill.  Absent: 

pedal edema, calf tenderness


  ** Left


Shoulder Exam: Present: tenderness, swelling, deformity.  Absent: full ROM, 

abrasion, laceration, ecchymosis


Upper Arm exam: Present: normal inspection, tenderness.  Absent: full ROM


Elbow exam: Present: normal inspection, full ROM.  Absent: tenderness, swelling


Forearm Wrist exam: Present: normal inspection, full ROM.  Absent: tenderness, 

swelling


Hand Wrist exam: Present: normal inspection, full ROM.  Absent: tenderness, 

swelling


Neurosensory exam: Present: radial nerve intact, ulnar nerve intact, median 

nerve intact


Vascular: Present: normal capillary refill.  Absent: vascular compromise, Pallo,

 pulse deficit radial art, pulse deficit ulnar art, pulse deficit brachial art


Back exam: Present: normal inspection.  Absent: CVA tenderness (R), CVA 

tenderness (L), vertebral tenderness


Neurological exam: Present: alert, CN II-XII intact.  Absent: motor sensory 

deficit


Skin exam: Present: warm, dry, intact, normal color.  Absent: rash





Course


                                   Vital Signs











  07/03/23 07/04/23





  21:14 06:00


 


Temperature 97 F L 98.3 F


 


Pulse Rate 103 H 79


 


Respiratory 16 16





Rate  


 


Blood Pressure 138/86 113/68


 


O2 Sat by Pulse 95 94 L





Oximetry  














Medical Decision Making





- Medical Decision Making





The patient had left shoulder x-ray that I interpreted as showing fracture of 

the humerus neck.  There also is a left clavicle fracture but that does appear 

to be chronic.


Computed tomography scan of the brain is obtained which I interpreted as being 

negative for acute bony injury or intracranial hemorrhage.








Was pt. sent in by a medical professional or institution ([facility] Ram[facility], NP, urgent 

care, hospital, or nursing home...) When possible be specific


@  -[No]


Did you speak to anyone other than the patient for history (EMS, parent, family,

police, friend...)? What history was obtained from this source 


@  -[EMS did give some history


Did you review nursing and triage notes (agree or disagree)?  Why? 


@  -[I reviewed and agree with nursing and triage notes]


Were old charts reviewed (outside hosp., previous admission, EMS record, old 

EKG, old radiological studies, urgent care reports/EKG's, nursing home records)?

Report findings 


@  -[No old charts were reviewed]


Differential Diagnosis (chest pain, altered mental status, abdominal pain women,

abdominal pain men, vaginal bleeding, weakness, fever, dyspnea, syncope, heada

rhoda, dizziness, GI bleed, back pain, seizure, CVA, palpatations, mental health, 

musculoskeletal)? 


@  -[Differential Musculoskeletal


Muscular strain, contusion, ligament sprain, fracture, arthritis, septic 

arthritis, bursitis, cellulitis, muscle spasm, nerve compression, DVT, arterial 

occlusion, herpes zoster, electrolyte abnormality, tumor.... This is not meant 

to be in all inclusive list


EKG interpreted by me (3pts min.).


@  -[As above]


X-rays interpreted by me (1pt min.).


@  -[As above


CT interpreted by me (1pt min.).


@  -[None done]


U/S interpreted by me (1pt. min.).


@  -[None done]


What testing was considered but not performed or refused? (CT, X-rays, U/S, 

labs)? Why?


@  -[None]


What meds were considered but not given or refused? Why?


@  -[None]


Did you discuss the management of the patient with other professionals 

(professionals i.e. [facility] Ram[facility], NP, lab, RT, psych nurse, , , 

teacher, , )? Give summary


@  -[No]


Was smoking cessation discussed for >3mins.?


@  -[No]


Was critical care preformed (if so, how long)?


@  -[No]


Were there social determinants of health that impacted care today? How? 

(Homelessness, low income, unemployed, alcoholism, drug addiction, transport

ation, low edu. Level, literacy, decrease access to med. care, senior care, rehab)?


@  -[No]


Was there de-escalation of care discussed even if they declined (Discuss DNR or 

withdrawal of care, Hospice)? DNR status


@  -[No]


What co-morbidities impacted this encounter? (DM, HTN, Smoking, COPD, CAD, 

Cancer, CVA, ARF, Chemo, Hep., AIDS, mental health diagnosis, sleep apnea, 

morbid obesity)?


@  -[None]


Was patient admitted / discharged? Hospital course, mention meds given and 

route, prescriptions, significant lab abnormalities, going to OR and other 

pertinent info.


@  -[Patient is 47-year-old man here after a ground-level fall.  The patient is 

moderately intoxicated on arrival and given the distracting injury and 

intoxication computed tomography scan of the brain is obtained to rule out acute

 injury.  The patient has proximal humerus fracture.  In addition there is clav

icle fracture but this is old injury.  Given that the patient is intoxicated, he

 is allowed to sleep until walking without ataxia.  The patient will follow-up 

with orthopedics for further care.  Discussed appropriate follow-up, or other 

return parameters


Undiagnosed new problem with uncertain prognosis?


@  -[No]


Drug Therapy requiring intensive monitoring for toxicity (Heparin, Nitro, 

Insulin, Cardizem)?


@  -[No]


Were any procedures done?


@  -[No]


Diagnosis/symptom?


@  -[Acute proximal humerus fracture


Chronic clavicle fracture


Acute alcohol intoxication


Acute, or Chronic, or Acute on Chronic?


@  -[default]


Uncomplicated (without systemic symptoms) or Complicated (systemic symptoms)?


@  -Uncomplicated


Side effects of treatment?


@  -[No]


Exacerbation, Progression, or Severe Exacerbation?


@  -[No]


Poses a threat to life or bodily function? How? (Chest pain, USA, MI, pneumonia,

 PE, COPD, DKA, ARF, appy, cholecystitis, CVA, Diverticulitis, Homicidal, 

Suicidal, threat to staff... and all critical care pts)


@  -[No]





Disposition


Clinical Impression: 


 Alcohol intoxication, Humerus surgical neck fracture





Disposition: HOME SELF-CARE


Condition: Fair


Instructions (If sedation given, give patient instructions):  Alcohol 

Intoxication (ED), Proximal Humerus Fracture (ED)


Prescriptions: 


Ibuprofen [Motrin] 600 mg PO Q8HR PRN #20 tab


 PRN Reason: Pain


Acetaminophen-Codeine 300-30mg [Tylenol w/codeine #3] 1 tab PO Q4H PRN #16 

tablet


 PRN Reason: Pain


Is patient prescribed a controlled substance at d/c from ED?: No


Referrals: 


Ethan Coivngton MD [Primary Care Provider] - 1-2 days


Ruth Nieto DO [Doctor of Osteopathic Medicine] - 1-2 days

## 2023-07-04 VITALS — SYSTOLIC BLOOD PRESSURE: 113 MMHG | DIASTOLIC BLOOD PRESSURE: 68 MMHG | HEART RATE: 79 BPM | TEMPERATURE: 98.3 F

## 2024-11-04 ENCOUNTER — HOSPITAL ENCOUNTER (EMERGENCY)
Dept: HOSPITAL 47 - EC | Age: 49
Discharge: HOME | End: 2024-11-04
Payer: COMMERCIAL

## 2024-11-04 VITALS — SYSTOLIC BLOOD PRESSURE: 118 MMHG | HEART RATE: 75 BPM | DIASTOLIC BLOOD PRESSURE: 75 MMHG | RESPIRATION RATE: 16 BRPM

## 2024-11-04 VITALS — TEMPERATURE: 97.9 F

## 2024-11-04 DIAGNOSIS — F10.129: Primary | ICD-10-CM

## 2024-11-04 DIAGNOSIS — Z86.73: ICD-10-CM

## 2024-11-04 DIAGNOSIS — F17.200: ICD-10-CM

## 2024-11-04 LAB — GLUCOSE BLD-MCNC: 96 MG/DL (ref 70–110)

## 2024-11-04 PROCEDURE — 82075 ASSAY OF BREATH ETHANOL: CPT

## 2024-11-04 PROCEDURE — 36415 COLL VENOUS BLD VENIPUNCTURE: CPT

## 2024-11-04 PROCEDURE — 99284 EMERGENCY DEPT VISIT MOD MDM: CPT

## 2024-12-07 ENCOUNTER — HOSPITAL ENCOUNTER (EMERGENCY)
Dept: HOSPITAL 47 - EC | Age: 49
Discharge: LEFT BEFORE DISCHARGE | End: 2024-12-07
Payer: COMMERCIAL

## 2024-12-07 VITALS
HEART RATE: 76 BPM | DIASTOLIC BLOOD PRESSURE: 115 MMHG | SYSTOLIC BLOOD PRESSURE: 146 MMHG | TEMPERATURE: 98.7 F | RESPIRATION RATE: 18 BRPM

## 2024-12-07 DIAGNOSIS — Z53.21: Primary | ICD-10-CM

## 2024-12-07 PROCEDURE — 99499 UNLISTED E&M SERVICE: CPT

## 2024-12-25 ENCOUNTER — HOSPITAL ENCOUNTER (OUTPATIENT)
Dept: HOSPITAL 47 - EC | Age: 49
Setting detail: OBSERVATION
Discharge: HOME | End: 2024-12-25
Attending: HOSPITALIST | Admitting: HOSPITALIST
Payer: COMMERCIAL

## 2024-12-25 VITALS — DIASTOLIC BLOOD PRESSURE: 88 MMHG | HEART RATE: 69 BPM | SYSTOLIC BLOOD PRESSURE: 151 MMHG

## 2024-12-25 VITALS — TEMPERATURE: 98.2 F

## 2024-12-25 VITALS — RESPIRATION RATE: 18 BRPM

## 2024-12-25 DIAGNOSIS — Z79.899: ICD-10-CM

## 2024-12-25 DIAGNOSIS — Z71.41: ICD-10-CM

## 2024-12-25 DIAGNOSIS — G92.8: ICD-10-CM

## 2024-12-25 DIAGNOSIS — Z71.6: ICD-10-CM

## 2024-12-25 DIAGNOSIS — F17.210: ICD-10-CM

## 2024-12-25 DIAGNOSIS — F10.931: ICD-10-CM
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
## 2024-12-25 DIAGNOSIS — Z86.73: ICD-10-CM

## 2024-12-25 DIAGNOSIS — H91.91: ICD-10-CM

## 2024-12-25 DIAGNOSIS — F10.929: Primary | ICD-10-CM

## 2024-12-25 LAB
ALBUMIN SERPL-MCNC: 4.8 G/DL (ref 3.5–5)
ALP SERPL-CCNC: 103 U/L (ref 38–126)
ALT SERPL-CCNC: 35 U/L (ref 4–49)
ANION GAP SERPL CALC-SCNC: 10 MMOL/L
AST SERPL-CCNC: 45 U/L (ref 17–59)
BASOPHILS # BLD AUTO: 0.1 K/UL (ref 0–0.2)
BASOPHILS NFR BLD AUTO: 1 %
BUN SERPL-SCNC: 8 MG/DL (ref 9–20)
CALCIUM SPEC-MCNC: 9 MG/DL (ref 8.4–10.2)
CHLORIDE SERPL-SCNC: 104 MMOL/L (ref 98–107)
CO2 SERPL-SCNC: 25 MMOL/L (ref 22–30)
EOSINOPHIL # BLD AUTO: 0.3 K/UL (ref 0–0.7)
EOSINOPHIL NFR BLD AUTO: 3 %
ERYTHROCYTE [DISTWIDTH] IN BLOOD BY AUTOMATED COUNT: 4.57 M/UL (ref 4.3–5.9)
ERYTHROCYTE [DISTWIDTH] IN BLOOD: 13.1 % (ref 11.5–15.5)
GLUCOSE SERPL-MCNC: 98 MG/DL (ref 74–99)
HCT VFR BLD AUTO: 44.4 % (ref 39–53)
HGB BLD-MCNC: 14.8 GM/DL (ref 13–17.5)
LIPASE SERPL-CCNC: 103 U/L (ref 23–300)
LYMPHOCYTES # SPEC AUTO: 3.2 K/UL (ref 1–4.8)
LYMPHOCYTES NFR SPEC AUTO: 37 %
MAGNESIUM SPEC-SCNC: 2.1 MG/DL (ref 1.6–2.3)
MCH RBC QN AUTO: 32.3 PG (ref 25–35)
MCHC RBC AUTO-ENTMCNC: 33.3 G/DL (ref 31–37)
MCV RBC AUTO: 97.2 FL (ref 80–100)
MONOCYTES # BLD AUTO: 0.5 K/UL (ref 0–1)
MONOCYTES NFR BLD AUTO: 5 %
NEUTROPHILS # BLD AUTO: 4.5 K/UL (ref 1.3–7.7)
NEUTROPHILS NFR BLD AUTO: 52 %
PLATELET # BLD AUTO: 289 K/UL (ref 150–450)
POTASSIUM SERPL-SCNC: 3.7 MMOL/L (ref 3.5–5.1)
PROT SERPL-MCNC: 7.4 G/DL (ref 6.3–8.2)
SODIUM SERPL-SCNC: 139 MMOL/L (ref 137–145)
WBC # BLD AUTO: 8.7 K/UL (ref 3.8–10.6)

## 2024-12-25 PROCEDURE — 83690 ASSAY OF LIPASE: CPT

## 2024-12-25 PROCEDURE — 36415 COLL VENOUS BLD VENIPUNCTURE: CPT

## 2024-12-25 PROCEDURE — 96360 HYDRATION IV INFUSION INIT: CPT

## 2024-12-25 PROCEDURE — 84100 ASSAY OF PHOSPHORUS: CPT

## 2024-12-25 PROCEDURE — 83735 ASSAY OF MAGNESIUM: CPT

## 2024-12-25 PROCEDURE — 80320 DRUG SCREEN QUANTALCOHOLS: CPT

## 2024-12-25 PROCEDURE — 96361 HYDRATE IV INFUSION ADD-ON: CPT

## 2024-12-25 PROCEDURE — 99285 EMERGENCY DEPT VISIT HI MDM: CPT

## 2024-12-25 PROCEDURE — 85025 COMPLETE CBC W/AUTO DIFF WBC: CPT

## 2024-12-25 PROCEDURE — 80053 COMPREHEN METABOLIC PANEL: CPT

## 2024-12-25 RX ADMIN — CEFAZOLIN STA MLS/HR: 330 INJECTION, POWDER, FOR SOLUTION INTRAMUSCULAR; INTRAVENOUS at 02:17

## 2024-12-25 RX ADMIN — FOLIC ACID SCH MG: 1 TABLET ORAL at 10:02

## 2024-12-25 RX ADMIN — NICARDIPINE HYDROCHLORIDE STA MLS/HR: 2.5 INJECTION INTRAVENOUS at 02:17

## 2024-12-25 RX ADMIN — THERA TABS SCH EACH: TAB at 10:02

## 2024-12-25 RX ADMIN — CEFAZOLIN SCH MLS/HR: 330 INJECTION, POWDER, FOR SOLUTION INTRAMUSCULAR; INTRAVENOUS at 03:30

## 2024-12-25 NOTE — ED
Alcohol HPI





- General


Stated Complaint: ETOH


Time Seen by Provider: 12/25/24 02:02


Source: RN notes reviewed, old records reviewed


Mode of arrival: EMS


Limitations: no limitations





- History of Present Illness


Initial Comments: 





This is a 49-year-old male to ER for significant alcohol intoxication patient is

a poor historian secondary to intoxicated state


MD Complaint: alcohol intoxication


Last Drink: just PTA


-: minute(s)


Associated Symptoms: denies other symptoms


Treatments Prior to Arrival: none


Chronic Alcohol Use: Yes





- Related Data


                                  Previous Rx's











 Medication  Instructions  Recorded


 


Folic Acid 1 mg PO DAILY #30 tab 12/25/24


 


Multivitamins, Thera [Multivitamin 1 each PO DAILY #30 tab 12/25/24





(formulary)]  


 


Thiamine [Vitamin B-1] 100 mg PO DAILY #30 tablet 12/25/24











                                    Allergies











Allergy/AdvReac Type Severity Reaction Status Date / Time


 


No Known Allergies Allergy   Verified 12/25/24 08:45














Review of Systems


ROS Statement: 


Those systems with pertinent positive or pertinent negative responses have been 

documented in the HPI.





ROS Other: All systems not noted in ROS Statement are negative.





Past Medical History


Past Medical History: No Reported History, CVA/TIA, Hearing Disorder / Deafness,

Myocardial Infarction (MI)


Additional Past Medical History / Comment(s): patient states he's had 2 strokes 

and 2 heart attacks (5/6 months ago) States he has never followed up with any 

physicians.right sided hearing loss


Last Myocardial Infarction Date:: unknown


History of Any Multi-Drug Resistant Organisms: None Reported


Past Surgical History: Unable to Obtain


Past Anesthesia/Blood Transfusion Reactions: No Reported Reaction


Past Psychological History: No Psychological Hx Reported


Smoking Status: Current every day smoker


Past Alcohol Use History: Daily, Heavy


Past Drug Use History: None Reported





- Past Family History


  ** Mother


Family Medical History: Cancer


Additional Family Medical History / Comment(s): breast and ovarian





General Exam


General appearance: alert, in no apparent distress, appears intoxicated


Head exam: Present: atraumatic, normocephalic, normal inspection


Eye exam: Present: normal appearance, PERRL, EOMI.  Absent: scleral icterus, 

conjunctival injection, periorbital swelling


ENT exam: Present: normal exam, mucous membranes moist


Neck exam: Present: normal inspection.  Absent: tenderness, meningismus, 

lymphadenopathy


Respiratory exam: Present: normal lung sounds bilaterally.  Absent: respiratory 

distress, wheezes, rales, rhonchi, stridor


Cardiovascular Exam: Present: regular rate, normal rhythm, normal heart sounds. 

Absent: systolic murmur, diastolic murmur, rubs, gallop, clicks


GI/Abdominal exam: Present: soft, normal bowel sounds.  Absent: distended, 

tenderness, guarding, rebound, rigid


Extremities exam: Present: normal inspection, full ROM, normal capillary refill.

 Absent: tenderness, pedal edema, joint swelling, calf tenderness


Back exam: Present: normal inspection


Neurological exam: Present: alert, oriented X3, CN II-XII intact


Psychiatric exam: Present: normal affect, normal mood


Skin exam: Present: warm, dry, intact, normal color.  Absent: rash





Course


                                   Vital Signs











  12/25/24 12/25/24 12/25/24





  01:59 04:05 05:58


 


Temperature 98.2 F  


 


Pulse Rate 86 70 72


 


Respiratory 15 14 15





Rate   


 


Blood Pressure 146/91 115/70 106/71


 


O2 Sat by Pulse 92 L 93 L 93 L





Oximetry   














  12/25/24 12/25/24 12/25/24





  11:14 13:10 17:14


 


Temperature   


 


Pulse Rate 81 82 69


 


Respiratory 18 18 18





Rate   


 


Blood Pressure 150/90 147/76 151/88


 


O2 Sat by Pulse 97 95 95





Oximetry   














- Reevaluation(s)


Reevaluation #1: 





12/25/24 02:29


Medical records reviewed


Reevaluation #2: 





12/25/24 03:07


No change in symptoms here in the ER


Reevaluation #3: 





12/25/24 03:07


Patient informed of results questions answered


Reevaluation #4: 





Was pt. sent in by a medical professional or institution (, PA, NP, urgent 

care, hospital, or nursing home...) When possible be specific


@  -no


Did you speak to anyone other than the patient for history (EMS, parent, family,

police, friend...)? What history was obtained from this source 


@  -no


Did you review nursing and triage notes (agree or disagree)?  Why? 


@  -agree


Are old charts reviewed (outside hosp., previous admission, EMS record, old EKG,

old radiological studies, urgent care reports/EKG's, nursing home records)? 

Report findings 


@  -yes


Differential Diagnosis (chest pain, altered mental status, abdominal pain women,

abdominal pain men, vaginal bleeding, weakness, fever, dyspnea, syncope, 

headache, dizziness, GI bleed, back pain, seizure, CVA, palpatations, mental 

health, musculoskeletal)? 


@  -prior


EKG interpreted by me (3pts min.).


@  -no


X-rays interpreted by me (1pt min.).


@  -no


CT interpreted by me (1pt min.).


@  -no


U/S interpreted by me (1pt. min.).


@  -no


What testing was considered but not performed or refused? (CT, X-rays, U/S, 

labs)? Why?


@  -none


What meds were considered but not given or refused? Why?


@  -none


Did you discuss the management of the patient with other professionals 

(professionals i.e. Dr., PA, NP, lab, RT, psych nurse, , , 

teacher, , )? Give summary


@  -no


Was smoking cessation discussed for >3mins.?


@  -no


Was critical care preformed (if so, how long)?


@  -no


Were there social determinants of health that impacted care today? How? (Homel

essness, low income, unemployed, alcoholism, drug addiction, transportation, low

edu. Level, literacy, decrease access to med. care, correction, rehab)?


@  -none


Was there de-escalation of care discussed even if they declined (Discuss DNR or 

withdrawal of care, Hospice)? DNR status


@  -no


What co-morbidities impacted this encounter? (DM, HTN, Smoking, COPD, CAD, 

Cancer, CVA, ARF, Chemo, Hep., AIDS, mental health diagnosis, sleep apnea, 

morbid obesity)?


@  -none


Was patient admitted / discharged? Hospital course, mention meds given and 

route, prescriptions, significant lab abnormalities, going to OR and other 

pertinent info.


@  - 49 male to ER for severe alcohol intoxication pending alcohol withdrawal.  

Patient will admit for observation


Admitted


Undiagnosed new problem with uncertain prognosis?


@  -no


Drug Therapy requiring intensive monitoring for toxicity (Heparin, Nitro, 

Insulin, Cardizem)?


@  -no


Were any procedures done?


@  -no


Diagnosis/symptom?


@  -Alcohol intoxication pending withdrawal


Acute, or Chronic, or Acute on Chronic?


@  -Acute


Uncomplicated (without systemic symptoms) or Complicated (systemic symptoms)?


@  -Complicated


Side effects of treatment?


@  -no


Exacerbation, Progression, or Severe Exacerbation?


@  -exacerbation


Poses a threat to life or bodily function? How? (Chest pain, USA, MI, pneumonia,

PE, COPD, DKA, ARF, appy, cholecystitis, CVA, Diverticulitis, Homicidal, 

Suicidal, threat to staff... and all critical care pts)


@  -yes significant withdrawal symptoms


Reevaluation #5: 





12/25/24 02:29


Differential Altered Mental Status:


Hypoglycemia, DKA, hypercapnia, ETOH, overdose, CO poisoning, trauma, myxedema 

coma, HTN encephalopathy, infection, encephalitis, psychosis, intercranial 

hemorrhage, hepatic encephalopathy, meningitis, CVA, this is not meant to be an 

all-inclusive list








Medical Decision Making





- Medical Decision Making





49 male to ER for severe alcohol intoxication pending alcohol withdrawal.  

Patient will admit for observation





- Lab Data


Result diagrams: 


                                 12/25/24 02:06





                                 12/25/24 02:06


                                   Lab Results











  12/25/24 12/25/24 Range/Units





  02:06 02:06 


 


WBC  8.7   (3.8-10.6)  k/uL


 


RBC  4.57   (4.30-5.90)  m/uL


 


Hgb  14.8   (13.0-17.5)  gm/dL


 


Hct  44.4   (39.0-53.0)  %


 


MCV  97.2   (80.0-100.0)  fL


 


MCH  32.3   (25.0-35.0)  pg


 


MCHC  33.3   (31.0-37.0)  g/dL


 


RDW  13.1   (11.5-15.5)  %


 


Plt Count  289   (150-450)  k/uL


 


MPV  7.3   


 


Neutrophils %  52   %


 


Lymphocytes %  37   %


 


Monocytes %  5   %


 


Eosinophils %  3   %


 


Basophils %  1   %


 


Neutrophils #  4.5   (1.3-7.7)  k/uL


 


Lymphocytes #  3.2   (1.0-4.8)  k/uL


 


Monocytes #  0.5   (0-1.0)  k/uL


 


Eosinophils #  0.3   (0-0.7)  k/uL


 


Basophils #  0.1   (0-0.2)  k/uL


 


Sodium   139  (137-145)  mmol/L


 


Potassium   3.7  (3.5-5.1)  mmol/L


 


Chloride   104  ()  mmol/L


 


Carbon Dioxide   25  (22-30)  mmol/L


 


Anion Gap   10  mmol/L


 


BUN   8 L  (9-20)  mg/dL


 


Creatinine   0.70  (0.66-1.25)  mg/dL


 


Est GFR (CKD-EPI)AfAm   >90  (>60 ml/min/1.73 sqM)  


 


Est GFR (CKD-EPI)NonAf   >90  (>60 ml/min/1.73 sqM)  


 


Glucose   98  (74-99)  mg/dL


 


Calcium   9.0  (8.4-10.2)  mg/dL


 


Phosphorus   4.0  (2.5-4.5)  mg/dL


 


Magnesium   2.1  (1.6-2.3)  mg/dL


 


Total Bilirubin   0.4  (0.2-1.3)  mg/dL


 


AST   45  (17-59)  U/L


 


ALT   35  (4-49)  U/L


 


Alkaline Phosphatase   103  ()  U/L


 


Total Protein   7.4  (6.3-8.2)  g/dL


 


Albumin   4.8  (3.5-5.0)  g/dL


 


Lipase   103  ()  U/L


 


Serum Alcohol   287 H*  mg/dL














Disposition


Clinical Impression: 


 Alcohol intoxication, Alcohol withdrawal syndrome, Alcohol withdrawal delirium





Disposition: ADMITTED AS IP TO THIS HOSP


Condition: Fair


Is patient prescribed a controlled substance at d/c from ED?: No


Time of Disposition: 03:00

## 2024-12-25 NOTE — P.HPIM
History of Present Illness





Please consider this note as combined H&P and discharge summary





Diagnoses:


Altered mental status secondary to alcohol intoxication, metabolic/toxic 

encephalopathy, currently completely resolved and patient back to baseline


Alcohol use disorder, patient was counseled extensively to quit and he agrees


Nicotine dependence, patient was counseled to quit and he agrees but he declines

nicotine patch


History of CVA/TIA, currently not an active issue


History of possible coronary artery disease and heart attack, not an active 

issue





History of present illness


This is a pleasant 49 years old maleWho presents initially because of alcohol 

intoxication.  Patient has past medical history of CVA/TIA, hearing 

disorder/deafness, and possible coronary artery disease


Patient currently awake alert oriented to time place and person.  He knows in 

the hospital and which hospital and he cannot tell me the date exactly of the 

name of the president, he has insight and follows commands and looks appropriate

count.


Patient told me his girlfriend called EMS for him to come to emergency room.  

Patient states he drinks 2-3 beers with no liquor.  He states that he drinks 

every day.  Also he smokes 1 pack/day and he was counseled to quit and he agrees

but he declines nicotine patch.  He declines illicit drugs.


Patient woke up right away and told the bedside nurse and emergency room at 12 

that he wants to be discharged otherwise he will leave A.  I went to talk to 

the patient and he was doing well overall, I offered to monitor him for another 

2 hours to see how he is doing clinically and if he tolerates lunch and he 

agrees.  After lunch he still feels fine send he still asked to be discharged.


Patient also denies any chest pain dyspnea coughing.  No abdominal pain vomiting

diarrhea.  No dysuria urgency or change in frequency.  No headache dizziness 

weakness or numbness in either extremity.


No leg pain no falling.


Patient currently is hemodynamically stable and afebrile.


Labs reviewed and he has unremarkable CBC, BMP, liver enzymes.  All normal.  

Lipase normal at 103.  Serum alcohol level was elevated on admission 287


When I ask him if he has any depression he declines also he declines suicidal or

homicidal ideation or hallucination or delusions.


Patient has capacity to make medical decisions and we cannot hold the patient 

against his will however currently patient medically stable.  Patient was 

requested to come to emergency room if he develops any further symptoms and he 

agreed.  Also patient was counseled to quit drinking alcohol and he agrees.


Problems and management plan were discussed with the patient and he verbalized 

understanding and acceptance


Patient was found stable and can be discharged home in guarded prognosis however

he needs follow-up as an outpatient. Patient was instructed to follow up with 

PCP within one week and patient agrees








Physical exam


Gen: patient is a AAOx3, no distress


CVS: S1-S2, RRR, no murmur


Lungs: B/L CTA, no wheezing


Abdomen: soft, no distention, no tenderness, positive bowel sounds


Extremity: no leg edema or induration





Time spent more than 35 minutes





Review of Systems





Review of systems


CONSTITUTIONAL: No fever, no malaise, no fatigue. 


HEENT: No recent visual problems or hearing problems. Denied any sore throat. 


CARDIOVASCULAR: No  orthopnea, PND, no palpitations, no syncope. 


PULMONARY: No shortness of breath, no cough, no hemoptysis. 


GASTROINTESTINAL: No diarrhea, no nausea, no vomiting, no abdominal pain. 

Normoactive bowel sounds. 


NEUROLOGICAL: No headaches, no weakness, no numbness. 


HEMATOLOGICAL: Denies any bleeding or petechiae. 


GENITOURINARY: Denies any burning micturition, frequency, or urgency. 


MUSCULOSKELETAL/RHEUMATOLOGICAL: Denies any joint pain, swelling, or any muscle 

pain. 


ENDOCRINE: Denies any polyuria or polydipsia.








Past Medical History


Past Medical History: No Reported History, CVA/TIA, Hearing Disorder / Deafness,

Myocardial Infarction (MI)


Additional Past Medical History / Comment(s): patient states he's had 2 strokes 

and 2 heart attacks (5/6 months ago) States he has never followed up with any 

physicians.right sided hearing loss


Last Myocardial Infarction Date:: unknown


History of Any Multi-Drug Resistant Organisms: None Reported


Past Surgical History: Unable to Obtain


Past Anesthesia/Blood Transfusion Reactions: No Reported Reaction


Past Psychological History: No Psychological Hx Reported


Smoking Status: Current every day smoker


Past Alcohol Use History: Daily, Heavy


Past Drug Use History: None Reported





- Past Family History


  ** Mother


Family Medical History: Cancer


Additional Family Medical History / Comment(s): breast and ovarian





Medications and Allergies


                                Home Medications











 Medication  Instructions  Recorded  Confirmed  Type


 


No Known Home Medications  12/25/24 12/25/24 History








                                    Allergies











Allergy/AdvReac Type Severity Reaction Status Date / Time


 


No Known Allergies Allergy   Verified 12/25/24 08:45














Physical Exam


Vitals: 


                                   Vital Signs











  Temp Pulse Resp BP Pulse Ox


 


 12/25/24 11:14   81  18  150/90  97


 


 12/25/24 05:58   72  15  106/71  93 L


 


 12/25/24 04:05   70  14  115/70  93 L


 


 12/25/24 01:59  98.2 F  86  15  146/91  92 L








                                Intake and Output











 12/24/24 12/25/24 12/25/24





 22:59 06:59 14:59


 


Other:   


 


  Weight  58.967 kg 














GENERAL: The patient is alert and oriented x3, not in any acute distress. Well 

developed, well nourished. 


HEENT: Pupils are round and equally reacting to light. EOMI. No scleral icterus.

No conjunctival pallor. Normocephalic, atraumatic. No pharyngeal erythema. No 

thyromegaly. 


CARDIOVASCULAR: S1 and S2 present. No murmurs, rubs, or gallops. 


PULMONARY: Chest is clear to auscultation, no wheezing , no crackles. 


ABDOMEN: Soft, nontender, nondistended, normoactive bowel sounds. No palpable 

organomegaly. 


MUSCULOSKELETAL: No joint swelling or deformity. 


EXTREMITIES: No cyanosis, clubbing, or pedal edema. 


NEUROLOGICAL: Gross neurological examination did not reveal any focal deficits. 


SKIN: No rashes. no petechiae.








Results


CBC & Chem 7: 


                                 12/25/24 02:06





                                 12/25/24 02:06


Labs: 


                  Abnormal Lab Results - Last 24 Hours (Table)











  12/25/24 Range/Units





  02:06 


 


BUN  8 L  (9-20)  mg/dL


 


Serum Alcohol  287 H*  mg/dL

## 2025-01-04 ENCOUNTER — HOSPITAL ENCOUNTER (OUTPATIENT)
Dept: HOSPITAL 47 - EC | Age: 50
Setting detail: OBSERVATION
LOS: 1 days | Discharge: HOME | End: 2025-01-05
Attending: HOSPITALIST | Admitting: HOSPITALIST
Payer: COMMERCIAL

## 2025-01-04 DIAGNOSIS — F17.200: ICD-10-CM

## 2025-01-04 DIAGNOSIS — Z71.41: ICD-10-CM

## 2025-01-04 DIAGNOSIS — F10.929: Primary | ICD-10-CM

## 2025-01-04 DIAGNOSIS — E86.9: ICD-10-CM
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
## 2025-01-04 DIAGNOSIS — E87.20: ICD-10-CM
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
## 2025-01-04 PROCEDURE — 93005 ELECTROCARDIOGRAM TRACING: CPT

## 2025-01-04 PROCEDURE — 85025 COMPLETE CBC W/AUTO DIFF WBC: CPT

## 2025-01-04 PROCEDURE — 86901 BLOOD TYPING SEROLOGIC RH(D): CPT

## 2025-01-04 PROCEDURE — 84484 ASSAY OF TROPONIN QUANT: CPT

## 2025-01-04 PROCEDURE — 86850 RBC ANTIBODY SCREEN: CPT

## 2025-01-04 PROCEDURE — 74177 CT ABD & PELVIS W/CONTRAST: CPT

## 2025-01-04 PROCEDURE — 86900 BLOOD TYPING SEROLOGIC ABO: CPT

## 2025-01-04 PROCEDURE — 85610 PROTHROMBIN TIME: CPT

## 2025-01-04 PROCEDURE — 80320 DRUG SCREEN QUANTALCOHOLS: CPT

## 2025-01-04 PROCEDURE — 72125 CT NECK SPINE W/O DYE: CPT

## 2025-01-04 PROCEDURE — 71045 X-RAY EXAM CHEST 1 VIEW: CPT

## 2025-01-04 PROCEDURE — 85730 THROMBOPLASTIN TIME PARTIAL: CPT

## 2025-01-04 PROCEDURE — 70450 CT HEAD/BRAIN W/O DYE: CPT

## 2025-01-04 PROCEDURE — 80053 COMPREHEN METABOLIC PANEL: CPT

## 2025-01-04 PROCEDURE — 83605 ASSAY OF LACTIC ACID: CPT

## 2025-01-04 PROCEDURE — 71260 CT THORAX DX C+: CPT

## 2025-01-04 PROCEDURE — 82550 ASSAY OF CK (CPK): CPT

## 2025-01-04 PROCEDURE — 36415 COLL VENOUS BLD VENIPUNCTURE: CPT

## 2025-01-04 PROCEDURE — 96361 HYDRATE IV INFUSION ADD-ON: CPT

## 2025-01-04 PROCEDURE — 96360 HYDRATION IV INFUSION INIT: CPT

## 2025-01-04 PROCEDURE — 99285 EMERGENCY DEPT VISIT HI MDM: CPT

## 2025-01-04 PROCEDURE — 72170 X-RAY EXAM OF PELVIS: CPT

## 2025-01-04 NOTE — ED
General Adult HPI





- General


Stated complaint: etoh


Time Seen by Provider: 01/04/25 23:39





- History of Present Illness


Initial comments: 


Dictation was produced using dragon dictation software. please excuse any 

grammatical, word or spelling errors. 











Chief Complaint: 49-year-old male found down at complex





History of Present Illness: 49-year-old male presents emergency department after

being found down at home.  Patient allegedly had multiple alcoholic beverages 

not a reliable historian at this time.  Per EMS he was found on the ground at 

his apartment complex.  Apparently he had been on the ground for around 1 hour. 

EMS noticed a bump on his face he was concern perhaps that maybe had a neck 

injury was given a c-collar.  States that he has been cooperative and alert.  

Patient has no complaints.  He admits to drink multiple alcoholic beverages t

padmini.





Unable to obtain ROS secondary to inebriation

















- Related Data


                                  Previous Rx's











 Medication  Instructions  Recorded


 


Folic Acid 1 mg PO DAILY #30 tab 12/25/24


 


Multivitamins, Thera [Multivitamin 1 each PO DAILY #30 tab 12/25/24





(formulary)]  


 


Thiamine [Vitamin B-1] 100 mg PO DAILY #30 tablet 12/25/24











                                    Allergies











Allergy/AdvReac Type Severity Reaction Status Date / Time


 


No Known Allergies Allergy   Verified 01/04/25 23:53














Review of Systems


ROS Statement: 


Those systems with pertinent positive or pertinent negative responses have been 

documented in the HPI.





ROS Other: All systems not noted in ROS Statement are negative.





Past Medical History


Past Medical History: No Reported History, CVA/TIA, Hearing Disorder / Deafness,

Myocardial Infarction (MI)


Additional Past Medical History / Comment(s): patient states he's had 2 strokes 

and 2 heart attacks (5/6 months ago) States he has never followed up with any 

physicians.right sided hearing loss


Last Myocardial Infarction Date:: unknown


History of Any Multi-Drug Resistant Organisms: None Reported


Past Surgical History: Unable to Obtain


Past Anesthesia/Blood Transfusion Reactions: No Reported Reaction


Past Psychological History: No Psychological Hx Reported


Smoking Status: Current every day smoker


Past Alcohol Use History: Daily, Heavy


Past Drug Use History: None Reported





- Past Family History


  ** Mother


Family Medical History: Cancer


Additional Family Medical History / Comment(s): breast and ovarian





General Exam





- General Exam Comments


Initial Comments: 











PHYSICAL EXAM:


General Impression: Alert and oriented x3, not in acute distress, inebriated


HEENT: Normocephalic atraumatic, extra-ocular movements intact, pupils equal and

reactive to light bilaterally, mucous membranes moist.


Cardiovascular: Heart regular rate and rhythm


Chest: Able to complete full sentences, no retractions, no tachypnea


Abdomen: abdomen soft, non-tender, non-distended, no organomegaly


Musculoskeletal: Pulses present and equal in all extremities, no peripheral 

edema


Motor:  no focal deficits noted


Neurological: CN II-XII grossly intact, no focal motor or sensory deficits noted


Skin: Intact with no visualized rashes


Psych: Normal affect and mood














Course


                                   Vital Signs











  01/04/25





  23:43


 


Temperature 97.2 F L


 


Pulse Rate 75


 


Respiratory 18





Rate 


 


Blood Pressure 147/95


 


O2 Sat by Pulse 96





Oximetry 














EKG Findings





- EKG Comments:


EKG Findings:: My EKG interpretation: Ventricular rate 74, sinus rhythm, MT 

interval 161, , QTc 413. No MT prolongation, no QTC prolongation, no ST 

or T-wave changes noted.  Overall, this EKG is unremarkable





Medical Decision Making





- Medical Decision Making


Was pt. sent in by a medical professional or institution (, PA, NP, urgent 

care, hospital, or nursing home...) When possible be specific


@  -[No]


Did you speak to anyone other than the patient for history (EMS, parent, family,

 police, friend...)? What history was obtained from this source 


@  -EMS as described above


Did you review nursing and triage notes (agree or disagree)?  Why? 


@  -[I reviewed and agree with nursing and triage notes]


Were old charts reviewed (outside hosp., previous admission, EMS record, old 

EKG, old radiological studies, urgent care reports/EKG's, nursing home records)?

Report findings 


@  -[No old charts were reviewed]


Differential Diagnosis (chest pain, altered mental status, abdominal pain women,

abdominal pain men, vaginal bleeding, musculoskeletal, weakness, fever, dyspnea,

syncope, headache, dizziness, GI bleed, back pain, seizure, CVA, palpatations, 

mental health)? 


@  -Differential Altered Mental Status:


Hypoglycemia, DKA, hypercapnia, ETOH, overdose, CO poisoning, trauma, myxedema 

coma, HTN encephalopathy, infection, encephalitis, psychosis, intercranial hemo

rrhage, hepatic encephalopathy, meningitis, CVA, this is not meant to be an all-

inclusive list





EKG interpreted by me (3pts min.).


@  -See above


X-rays interpreted by me (1pt min.).


@  -Office x-ray and chest x-ray shows no acute processes


CT interpreted by me (1pt min.).


@  -CT brain and C-spine shows no acute processes.,  CT chest abdomen pelvis 

shows no acute processes.


U/S interpreted by me (1pt. min.).


@  -[None done]


What testing was considered but not performed or refused? (CT, X-rays, U/S, 

labs)? Why?


@  -[None]


What meds were considered but not given or refused? Why?


@  -[None]


Was smoking cessation discussed for >3mins.?


@  -[No]


Were there social determinants of health that impacted care today? How? 

(Homelessness, low income, unemployed, alcoholism, drug addiction, transportatio

n, low edu. Level, literacy, decrease access to med. care, [facility], rehab)?


@  -Alcoholic


Was there de-escalation of care discussed even if they declined (Discuss DNR or 

withdrawal of care, Hospice)? DNR status


@  -[No]


What co-morbidities impacted this encounter? (DM, HTN, Smoking, COPD, CAD, 

Cancer, CVA, ARF, Chemo, Hep., AIDS, mental health diagnosis, sleep apnea, 

morbid obesity)?


@  -[None]


Was patient admitted / discharged? Hospital course, mention meds given and 

route, prescriptions, significant lab abnormalities, going to OR and other 

pertinent info.


@  -49-year-old male presents emergency department for alcohol intoxication 

after being found down.  Vital signs upon arrival are within acceptable limits. 

 Patient has no gross traumatic abnormalities on physical examination.  He is 

well-appearing no acute distress.  Laboratory evaluation obtained.  Labs shows 

findings within acceptable limits except for serum alcohol 319.  Pan scan shows 

no acute processes.  Patient will be admitted observation for acute alcohol 

intoxication.


Did you discuss the management of the patient with other professionals 

(professionals i.e. , PA, NP, lab, RT, psych nurse, , , 

teacher, , )? Give summary


@  -Discussed with hospitalist for admission


Was critical care preformed (if so, how long)?


@  -[No]


Undiagnosed new problem with uncertain prognosis?


@  -[No]


Drug Therapy requiring intensive monitoring for toxicity (Heparin, Nitro, 

Insulin, Cardizem)?


@  -[No]


Were any procedures done?


@  -[No]


Diagnosis/symptom?  Acute, or Chronic, or Acute on Chronic?  Uncomplicated 

(without systemic symptoms) or Complicated (systemic symptoms)?


@  -Acute alcohol intoxication


Side effects of treatment?


@  -[No]


Exacerbation, Progression, or Severe Exacerbation?


@  -[No]


Poses a threat to life or bodily function? How? (Chest pain, USA, MI, pneumonia,

 PE, COPD, DKA, ARF, appy, cholecystitis, CVA, Diverticulitis, Homicidal, 

Suicidal, threat to staff... and all critical care pts)


@  -yes











- Lab Data


Result diagrams: 


                                 01/04/25 23:53





                                 01/04/25 23:53


                                   Lab Results











  01/04/25 01/04/25 01/04/25 Range/Units





  23:52 23:53 23:53 


 


WBC   7.9   (3.8-10.6)  k/uL


 


RBC   4.69   (4.30-5.90)  m/uL


 


Hgb   14.9   (13.0-17.5)  gm/dL


 


Hct   46.1   (39.0-53.0)  %


 


MCV   98.3   (80.0-100.0)  fL


 


MCH   31.7   (25.0-35.0)  pg


 


MCHC   32.2   (31.0-37.0)  g/dL


 


RDW   12.5   (11.5-15.5)  %


 


Plt Count   361   (150-450)  k/uL


 


MPV   6.8   


 


Neutrophils %   35   %


 


Lymphocytes %   52   %


 


Monocytes %   5   %


 


Eosinophils %   4   %


 


Basophils %   1   %


 


Neutrophils #   2.8   (1.3-7.7)  k/uL


 


Lymphocytes #   4.1   (1.0-4.8)  k/uL


 


Monocytes #   0.4   (0-1.0)  k/uL


 


Eosinophils #   0.3   (0-0.7)  k/uL


 


Basophils #   0.1   (0-0.2)  k/uL


 


PT    10.7  (10.0-12.5)  sec


 


INR    1.0  (<1.2)  


 


APTT    24.8  (22.0-30.0)  sec


 


Sodium     (137-145)  mmol/L


 


Potassium     (3.5-5.1)  mmol/L


 


Chloride     ()  mmol/L


 


Carbon Dioxide     (22-30)  mmol/L


 


Anion Gap     mmol/L


 


BUN     (9-20)  mg/dL


 


Creatinine     (0.66-1.25)  mg/dL


 


Est GFR (CKD-EPI)AfAm     (>60 ml/min/1.73 sqM)  


 


Est GFR (CKD-EPI)NonAf     (>60 ml/min/1.73 sqM)  


 


Glucose     (74-99)  mg/dL


 


Plasma Lactic Acid Buck     (0.7-2.0)  mmol/L


 


Calcium     (8.4-10.2)  mg/dL


 


Total Bilirubin     (0.2-1.3)  mg/dL


 


AST     (17-59)  U/L


 


ALT     (4-49)  U/L


 


Alkaline Phosphatase     ()  U/L


 


Creatine Kinase     ()  U/L


 


Troponin I     (0.000-0.034)  ng/mL


 


Total Protein     (6.3-8.2)  g/dL


 


Albumin     (3.5-5.0)  g/dL


 


Serum Alcohol     mg/dL


 


Blood Type  AB Positive    


 


Blood Type Confirm     


 


Blood Type Recheck     


 


Bld Type Recheck Status     


 


Antibody Screen  NEGATIVE    


 


Spec Expiration Date     














  01/04/25 01/04/25 01/04/25 Range/Units





  23:53 23:53 23:53 


 


WBC     (3.8-10.6)  k/uL


 


RBC     (4.30-5.90)  m/uL


 


Hgb     (13.0-17.5)  gm/dL


 


Hct     (39.0-53.0)  %


 


MCV     (80.0-100.0)  fL


 


MCH     (25.0-35.0)  pg


 


MCHC     (31.0-37.0)  g/dL


 


RDW     (11.5-15.5)  %


 


Plt Count     (150-450)  k/uL


 


MPV     


 


Neutrophils %     %


 


Lymphocytes %     %


 


Monocytes %     %


 


Eosinophils %     %


 


Basophils %     %


 


Neutrophils #     (1.3-7.7)  k/uL


 


Lymphocytes #     (1.0-4.8)  k/uL


 


Monocytes #     (0-1.0)  k/uL


 


Eosinophils #     (0-0.7)  k/uL


 


Basophils #     (0-0.2)  k/uL


 


PT     (10.0-12.5)  sec


 


INR     (<1.2)  


 


APTT     (22.0-30.0)  sec


 


Sodium  142    (137-145)  mmol/L


 


Potassium  4.0    (3.5-5.1)  mmol/L


 


Chloride  104    ()  mmol/L


 


Carbon Dioxide  26    (22-30)  mmol/L


 


Anion Gap  12    mmol/L


 


BUN  6 L    (9-20)  mg/dL


 


Creatinine  0.66    (0.66-1.25)  mg/dL


 


Est GFR (CKD-EPI)AfAm  >90    (>60 ml/min/1.73 sqM)  


 


Est GFR (CKD-EPI)NonAf  >90    (>60 ml/min/1.73 sqM)  


 


Glucose  83    (74-99)  mg/dL


 


Plasma Lactic Acid Buck     (0.7-2.0)  mmol/L


 


Calcium  8.9    (8.4-10.2)  mg/dL


 


Total Bilirubin  0.4    (0.2-1.3)  mg/dL


 


AST  51    (17-59)  U/L


 


ALT  45    (4-49)  U/L


 


Alkaline Phosphatase  100    ()  U/L


 


Creatine Kinase  278 H    ()  U/L


 


Troponin I   <0.012   (0.000-0.034)  ng/mL


 


Total Protein  7.7    (6.3-8.2)  g/dL


 


Albumin  4.9    (3.5-5.0)  g/dL


 


Serum Alcohol  319 H*    mg/dL


 


Blood Type     


 


Blood Type Confirm     


 


Blood Type Recheck    No Previous Record  


 


Bld Type Recheck Status    CABO Indicated  


 


Antibody Screen     


 


Spec Expiration Date    01/07/2025 - 2353 01/04/25 01/05/25 Range/Units





  23:59 00:53 


 


WBC    (3.8-10.6)  k/uL


 


RBC    (4.30-5.90)  m/uL


 


Hgb    (13.0-17.5)  gm/dL


 


Hct    (39.0-53.0)  %


 


MCV    (80.0-100.0)  fL


 


MCH    (25.0-35.0)  pg


 


MCHC    (31.0-37.0)  g/dL


 


RDW    (11.5-15.5)  %


 


Plt Count    (150-450)  k/uL


 


MPV    


 


Neutrophils %    %


 


Lymphocytes %    %


 


Monocytes %    %


 


Eosinophils %    %


 


Basophils %    %


 


Neutrophils #    (1.3-7.7)  k/uL


 


Lymphocytes #    (1.0-4.8)  k/uL


 


Monocytes #    (0-1.0)  k/uL


 


Eosinophils #    (0-0.7)  k/uL


 


Basophils #    (0-0.2)  k/uL


 


PT    (10.0-12.5)  sec


 


INR    (<1.2)  


 


APTT    (22.0-30.0)  sec


 


Sodium    (137-145)  mmol/L


 


Potassium    (3.5-5.1)  mmol/L


 


Chloride    ()  mmol/L


 


Carbon Dioxide    (22-30)  mmol/L


 


Anion Gap    mmol/L


 


BUN    (9-20)  mg/dL


 


Creatinine    (0.66-1.25)  mg/dL


 


Est GFR (CKD-EPI)AfAm    (>60 ml/min/1.73 sqM)  


 


Est GFR (CKD-EPI)NonAf    (>60 ml/min/1.73 sqM)  


 


Glucose    (74-99)  mg/dL


 


Plasma Lactic Acid Buck   2.5 H*  (0.7-2.0)  mmol/L


 


Calcium    (8.4-10.2)  mg/dL


 


Total Bilirubin    (0.2-1.3)  mg/dL


 


AST    (17-59)  U/L


 


ALT    (4-49)  U/L


 


Alkaline Phosphatase    ()  U/L


 


Creatine Kinase    ()  U/L


 


Troponin I    (0.000-0.034)  ng/mL


 


Total Protein    (6.3-8.2)  g/dL


 


Albumin    (3.5-5.0)  g/dL


 


Serum Alcohol    mg/dL


 


Blood Type    


 


Blood Type Confirm  AB Positive   


 


Blood Type Recheck    


 


Bld Type Recheck Status    


 


Antibody Screen    


 


Spec Expiration Date    














Disposition


Clinical Impression: 


 Alcohol intoxication





Disposition: ADMITTED AS IP TO THIS [facility]


Condition: Fair


Referrals: 


None,Stated [Primary Care Provider] - 1-2 days


Decision Time: 02:58

## 2025-01-05 VITALS
RESPIRATION RATE: 16 BRPM | DIASTOLIC BLOOD PRESSURE: 85 MMHG | SYSTOLIC BLOOD PRESSURE: 152 MMHG | TEMPERATURE: 98.9 F | HEART RATE: 84 BPM

## 2025-01-05 LAB
ALBUMIN SERPL-MCNC: 4.9 G/DL (ref 3.5–5)
ALP SERPL-CCNC: 100 U/L (ref 38–126)
ALT SERPL-CCNC: 45 U/L (ref 4–49)
ANION GAP SERPL CALC-SCNC: 12 MMOL/L
APTT BLD: 24.8 SEC (ref 22–30)
AST SERPL-CCNC: 51 U/L (ref 17–59)
BASOPHILS # BLD AUTO: 0.1 K/UL (ref 0–0.2)
BASOPHILS NFR BLD AUTO: 1 %
BUN SERPL-SCNC: 6 MG/DL (ref 9–20)
CALCIUM SPEC-MCNC: 8.9 MG/DL (ref 8.4–10.2)
CHLORIDE SERPL-SCNC: 104 MMOL/L (ref 98–107)
CK SERPL-CCNC: 278 U/L (ref 55–170)
CO2 SERPL-SCNC: 26 MMOL/L (ref 22–30)
EOSINOPHIL # BLD AUTO: 0.3 K/UL (ref 0–0.7)
EOSINOPHIL NFR BLD AUTO: 4 %
ERYTHROCYTE [DISTWIDTH] IN BLOOD BY AUTOMATED COUNT: 4.69 M/UL (ref 4.3–5.9)
ERYTHROCYTE [DISTWIDTH] IN BLOOD: 12.5 % (ref 11.5–15.5)
GLUCOSE SERPL-MCNC: 83 MG/DL (ref 74–99)
HCT VFR BLD AUTO: 46.1 % (ref 39–53)
HGB BLD-MCNC: 14.9 GM/DL (ref 13–17.5)
INR PPP: 1 (ref ?–1.2)
LYMPHOCYTES # SPEC AUTO: 4.1 K/UL (ref 1–4.8)
LYMPHOCYTES NFR SPEC AUTO: 52 %
MCH RBC QN AUTO: 31.7 PG (ref 25–35)
MCHC RBC AUTO-ENTMCNC: 32.2 G/DL (ref 31–37)
MCV RBC AUTO: 98.3 FL (ref 80–100)
MONOCYTES # BLD AUTO: 0.4 K/UL (ref 0–1)
MONOCYTES NFR BLD AUTO: 5 %
NEUTROPHILS # BLD AUTO: 2.8 K/UL (ref 1.3–7.7)
NEUTROPHILS NFR BLD AUTO: 35 %
PLATELET # BLD AUTO: 361 K/UL (ref 150–450)
POTASSIUM SERPL-SCNC: 4 MMOL/L (ref 3.5–5.1)
PROT SERPL-MCNC: 7.7 G/DL (ref 6.3–8.2)
PT BLD: 10.7 SEC (ref 10–12.5)
SODIUM SERPL-SCNC: 142 MMOL/L (ref 137–145)
WBC # BLD AUTO: 7.9 K/UL (ref 3.8–10.6)

## 2025-01-05 RX ADMIN — CEFAZOLIN ONE MLS/HR: 330 INJECTION, POWDER, FOR SOLUTION INTRAMUSCULAR; INTRAVENOUS at 11:48

## 2025-01-05 RX ADMIN — NICARDIPINE HYDROCHLORIDE STA MLS/HR: 2.5 INJECTION INTRAVENOUS at 06:13

## 2025-01-05 NOTE — P.HPIM
History of Present Illness


49-year-old male was brought to ER because the patient fell after he got 

intoxicated patient does not drink on regular basis patient does not have any 

elevated MCV.  Patient wanted to go home although his lactate is elevated 

patient only had 500 cc bolus so far.  Patient denied any withdrawal symptoms in

the past patient is not willing to quit alcohol although only drinking 

occasionally.  Patient is alert oriented x 3 sober at this time.  Will not 

require any further hospitalization.








REVIEW OF SYSTEMS: 








All other systems are negative except those mentioned in the HPI





PHYSICAL EXAMINATION: 





GENERAL: The patient is alert and oriented x3, not in any acute distress. Well 

developed, well nourished. 


HEENT: Pupils are round and equally reacting to light. EOMI. No scleral icterus.

No conjunctival pallor. Normocephalic, atraumatic. No pharyngeal erythema. No 

thyromegaly. 


CARDIOVASCULAR: S1 and S2 present. No murmurs, rubs, or gallops. 


PULMONARY: Chest is clear to auscultation, no wheezing or crackles. 


ABDOMEN: Soft, nontender, nondistended, normoactive bowel sounds. No palpable 

organomegaly. 


MUSCULOSKELETAL: No joint swelling or deformity.


EXTREMITIES: No cyanosis, clubbing, or pedal edema. 


NEUROLOGICAL: Gross neurological examination did not reveal any focal deficits. 


SKIN: No rashes. 





Assessment and plan


-Alcohol intoxication: Patient is sober patient can be discharged from that 

perspective as patient does not drink alcohol on regular basis and there is no 

biochemical evidence of for chronic alcoholism either patient does not need to 

be monitored for alcohol withdrawal at this time.  All alcohol cessation 

counseling was provided


-Lactic acidosis secondary to intravascular volume depletion patient will be 

given a bolus of IV fluid we will recheck the lactate and if it comes down below

2 patient will be discharged today








Past Medical History


Past Medical History: No Reported History, CVA/TIA, Hearing Disorder / Deafness,

Myocardial Infarction (MI)


Additional Past Medical History / Comment(s): patient states he's had 2 strokes 

and 2 heart attacks. States he has never followed up with any physicians.right 

sided hearing loss


Last Myocardial Infarction Date:: unknown


History of Any Multi-Drug Resistant Organisms: None Reported


Past Surgical History: Unable to Obtain


Past Anesthesia/Blood Transfusion Reactions: No Reported Reaction


Past Psychological History: No Psychological Hx Reported


Smoking Status: Current every day smoker


Past Alcohol Use History: Daily, Heavy


Past Drug Use History: None Reported





- Past Family History


  ** Mother


Family Medical History: Cancer


Additional Family Medical History / Comment(s): breast and ovarian





Medications and Allergies


                                Home Medications











 Medication  Instructions  Recorded  Confirmed  Type


 


Folic Acid 1 mg PO DAILY #30 tab 12/25/24  Rx


 


Multivitamins, Thera [Multivitamin 1 each PO DAILY #30 tab 12/25/24  Rx





(formulary)]    


 


Thiamine [Vitamin B-1] 100 mg PO DAILY #30 tablet 12/25/24  Rx








                                    Allergies











Allergy/AdvReac Type Severity Reaction Status Date / Time


 


No Known Allergies Allergy   Verified 01/04/25 23:53














Physical Exam


Vitals: 


                                   Vital Signs











  Temp Pulse Pulse Resp BP BP Pulse Ox


 


 01/05/25 07:02  98.2 F   75  18   114/66  93 L


 


 01/05/25 05:46   84   16  126/88   93 L


 


 01/05/25 03:40   80   16  119/83   93 L


 


 01/05/25 03:00   68   16  106/62   92 L


 


 01/05/25 01:00   75   16  143/96   93 L


 


 01/04/25 23:43  97.2 F L  75   18  147/95   96








                                Intake and Output











 01/04/25 01/05/25 01/05/25





 22:59 06:59 14:59


 


Other:   


 


  # Voids  1 


 


  Weight  68.039 kg 














Results


CBC & Chem 7: 


                                 01/04/25 23:53





                                 01/04/25 23:53


Labs: 


                  Abnormal Lab Results - Last 24 Hours (Table)











  01/04/25 01/05/25 01/05/25 Range/Units





  23:53 00:53 04:22 


 


BUN  6 L    (9-20)  mg/dL


 


Plasma Lactic Acid Buck   2.5 H*  2.5 H*  (0.7-2.0)  mmol/L


 


Creatine Kinase  278 H    ()  U/L


 


Serum Alcohol  319 H*    mg/dL














  01/05/25 Range/Units





  07:28 


 


BUN   (9-20)  mg/dL


 


Plasma Lactic Acid Buck  2.5 H*  (0.7-2.0)  mmol/L


 


Creatine Kinase   ()  U/L


 


Serum Alcohol   mg/dL

## 2025-01-05 NOTE — P.DS
Providers


Date of admission: 


01/05/25 03:00





Attending physician: 


Sharyn Luevano





Primary care physician: 


Stated None





Hospital Course: 





49-year-old male was brought to ER because the patient fell after he got 

intoxicated patient does not drink on regular basis patient does not have any 

elevated MCV.  Patient wanted to go home although his lactate is elevated 

patient only had 500 cc bolus so far.  Patient denied any withdrawal symptoms in

the past patient is not willing to quit alcohol although only drinking 

occasionally.  Patient is alert oriented x 3 sober at this time.  Will not 

require any further hospitalization.








REVIEW OF SYSTEMS: 








All other systems are negative except those mentioned in the HPI





PHYSICAL EXAMINATION: 





GENERAL: The patient is alert and oriented x3, not in any acute distress. Well 

developed, well nourished. 


HEENT: Pupils are round and equally reacting to light. EOMI. No scleral icterus.

No conjunctival pallor. Normocephalic, atraumatic. No pharyngeal erythema. No 

thyromegaly. 


CARDIOVASCULAR: S1 and S2 present. No murmurs, rubs, or gallops. 


PULMONARY: Chest is clear to auscultation, no wheezing or crackles. 


ABDOMEN: Soft, nontender, nondistended, normoactive bowel sounds. No palpable 

organomegaly. 


MUSCULOSKELETAL: No joint swelling or deformity.


EXTREMITIES: No cyanosis, clubbing, or pedal edema. 


NEUROLOGICAL: Gross neurological examination did not reveal any focal deficits. 


SKIN: No rashes. 





Assessment and plan


-Alcohol intoxication: Patient is sober patient can be discharged from that 

perspective as patient does not drink alcohol on regular basis and there is no 

biochemical evidence of for chronic alcoholism either patient does not need to 

be monitored for alcohol withdrawal at this time.  All alcohol cessation 

counseling was provided


-Lactic acidosis secondary to intravascular volume depletion patient will be 

given a bolus of IV fluid we will recheck the lactate and if it comes down below

2 patient will be discharged today








Past Medical History


Patient Condition at Discharge: Fair





Plan - Discharge Summary


New Discharge Prescriptions: 


No Action


   Folic Acid 1 mg PO DAILY #30 tab


   Thiamine [Vitamin B-1] 100 mg PO DAILY #30 tablet


   Multivitamins, Thera [Multivitamin (formulary)] 1 each PO DAILY #30 tab


Discharge Medication List





Folic Acid 1 mg PO DAILY #30 tab 12/25/24 [Rx]


Multivitamins, Thera [Multivitamin (formulary)] 1 each PO DAILY #30 tab 12/25/24

[Rx]


Thiamine [Vitamin B-1] 100 mg PO DAILY #30 tablet 12/25/24 [Rx]








Follow up Appointment(s)/Referral(s): 


None,Stated [Primary Care Provider] - 1-2 days


Bertrand Machuca MD [REFERRING] - 1 Week


Discharge Disposition: HOME SELF-CARE

## 2025-01-05 NOTE — CT
EXAM:

  CT Head Without Intravenous Contrast

 

CLINICAL HISTORY:

  Trauma

 

TECHNIQUE:

  Axial computed tomography images of the head/brain without intravenous 

contrast.  CTDI is 45.2 mGy and DLP is 1113 mGy-cm.  This CT exam was 

performed using one or more of the following dose reduction techniques: 

automated exposure control, adjustment of the mA and/or kV according to 

patient size, and/or use of iterative reconstruction technique.

 

COMPARISON:

  No relevant prior studies available.

 

FINDINGS:

  Brain:  Ventricle and sulci normal in size and configuration for age.  

No acute stroke.  No acute hemorrhage.  No abnormal extra-axial fluid 

collection.

  Ventricles:  No hydrocephalus.  No midline shift.

  Bones/joints:  Unremarkable.  No acute fracture.

  Soft tissues:  Unremarkable.

  Sinuses:  Unremarkable as visualized.  No acute sinusitis.

 

IMPRESSION:     

  No acute post-traumatic intracranial abnormality.

 

_______________________________________________

 

EXAM:

  CT Cervical Spine Without Intravenous Contrast

 

CLINICAL HISTORY:

  Trauma

 

TECHNIQUE:

  Axial computed tomography images of the cervical spine without 

intravenous contrast.  CTDI is 10.2 mGy and DLP is 339.6 mGy-cm.  This CT 

exam was performed using one or more of the following dose reduction 

techniques: automated exposure control, adjustment of the mA and/or kV 

according to patient size, and/or use of iterative reconstruction 

technique.

 

COMPARISON:

  No relevant prior studies available.

 

FINDINGS:

  Vertebrae:  No acute fracture.  Maintenance of height of the vertebral 

bodies.  No subluxation.

  Discs/spinal canal/neural foramina: Mild degenerative changes at C5-6.

  Soft tissues:  Prevertebral soft tissues are unremarkable.  Right 

apical subpleural blebs.  Apparent asymmetrically enlarged left 

submandibular gland (axial series 304 image 69), not well characterized.  

 

IMPRESSION:     

  No acute post-traumatic abnormality of the cervical spine.

  Apparent asymmetrically enlarged left submandibular gland, not well 

characterized; recommend correlation to site injury.

## 2025-01-05 NOTE — XR
EXAM:

  XR Pelvis, 1 or 2 Views

 

CLINICAL HISTORY:

  Trauma

 

TECHNIQUE:

  Frontal view of the pelvis.

 

COMPARISON:

  No relevant prior studies available.

 

FINDINGS:

  Bones/joints:  Unremarkable.  No acute fracture.  No dislocation.

  Soft tissues:  Unremarkable.

 

IMPRESSION:     

  No acute post-traumatic abnormality.

## 2025-01-05 NOTE — XR
EXAM:

  XR Chest, 1 View

 

CLINICAL HISTORY:

  Trauma

 

TECHNIQUE:

  Frontal view of the chest.

 

COMPARISON:

  No relevant prior studies available.

 

FINDINGS:

  Lungs:  No infiltrate.  No atelectasis.  No CHF.

  Pleural space:  No pleural effusion.  No pneumothorax.

  Heart:  Unremarkable.  No cardiomegaly.

  Mediastinum:  Unremarkable.  Normal mediastinal contour.

  Bones/joints: Old distal left clavicle fracture.  Old lateral left 

seventh rib fracture.  No acute fracture.

 

IMPRESSION:     

  No acute post-traumatic abnormality.

## 2025-01-05 NOTE — CT
ADDENDUM - Added by Hossein Beckett M.D. on 1/5/2025 1:48 AM (-05:00)

ADDENDUM:

 

Correction:

 

 CT Chest:

 

Old distal left clavicle fracture.

----------------------------------------------------------------------

 

EXAM:

  CT Chest With Intravenous Contrast

 

CLINICAL HISTORY:

  Trauma

 

TECHNIQUE:

  Axial computed tomography images of the chest with intravenous contrast.

  CTDI is 7 mGy and DLP is 562.5 mGy-cm.  This CT exam was performed 

using one or more of the following dose reduction techniques: automated 

exposure control, adjustment of the mA and/or kV according to patient 

size, and/or use of iterative reconstruction technique.

 

COMPARISON:

  No relevant prior studies available.

 

FINDINGS:

  Lungs: Minimal dependent atelectasis.  No mass.  No consolidation.

  Pleural space:  Unremarkable.  No pneumothorax.  No pleural effusion.

  Heart:  Unremarkable.  No cardiomegaly.  No significant pericardial 

effusion.  No significant coronary artery calcifications.

  Bones/joints: Old left lateral seventh rib fracture.  No acute fracture.

 

  Soft tissues:  Unremarkable.

  Vasculature:  Unremarkable.  No thoracic aortic aneurysm.

  Lymph nodes:  Unremarkable.  No enlarged lymph nodes.

 

IMPRESSION:     

  No acute post-traumatic abnormality.

 

_______________________________________________

 

EXAM:

  CT Abdomen and Pelvis With Intravenous Contrast

 

CLINICAL HISTORY:

  Trauma

 

TECHNIQUE:

  Axial computed tomography images of the abdomen and pelvis with 

intravenous contrast.  CTDI is 6.7 mGy and DLP is 249.4 mGy-cm.  This CT 

exam was performed using one or more of the following dose reduction 

techniques: automated exposure control, adjustment of the mA and/or kV 

according to patient size, and/or use of iterative reconstruction 

technique.

 

COMPARISON:

  No relevant prior studies available.

 

FINDINGS:

  

 ABDOMEN:

  Liver:  Unremarkable.  No mass.

  Gallbladder and bile ducts:  Unremarkable.  No calcified stones.  No 

ductal dilation.

  Pancreas:  Unremarkable.  No mass.  No ductal dilation.

  Spleen:  Unremarkable.  No splenomegaly.

  Adrenals:  Unremarkable.  No mass.

  Kidneys and ureters:  No obstructive uropathy.  No obstructing renal or 

ureteral calculi.  No hydronephrosis or hydroureter.

  Stomach and bowel:  No obstruction or ileus.  No evidence for 

diverticulitis.

 

 PELVIS:

  Appendix:  No findings to suggest acute appendicitis.

  Bladder: Well distended.  No mass.

  Reproductive:  Unremarkable as visualized.

 

 ABDOMEN and PELVIS:

  Intraperitoneal space:  No free air.  No free fluid.

  Bones/joints:  No acute fracture.

  Soft tissues:  Unremarkable.

  Vasculature:  Atherosclerotic vascular calcifications..  No abdominal 

aortic aneurysm.

  Lymph nodes:  Unremarkable.  No enlarged lymph nodes.

 

IMPRESSION:     

  No acute post-traumatic abnormality.

## 2025-03-07 ENCOUNTER — HOSPITAL ENCOUNTER (EMERGENCY)
Dept: HOSPITAL 47 - EC | Age: 50
Discharge: HOME | End: 2025-03-07
Payer: COMMERCIAL

## 2025-03-07 VITALS — TEMPERATURE: 98.1 F

## 2025-03-07 VITALS — HEART RATE: 72 BPM | DIASTOLIC BLOOD PRESSURE: 82 MMHG | SYSTOLIC BLOOD PRESSURE: 123 MMHG

## 2025-03-07 VITALS — RESPIRATION RATE: 18 BRPM
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
## 2025-03-07 DIAGNOSIS — F10.129: ICD-10-CM

## 2025-03-07 DIAGNOSIS — S09.90XA: Primary | ICD-10-CM

## 2025-03-07 DIAGNOSIS — F17.200: ICD-10-CM

## 2025-03-07 DIAGNOSIS — Z86.73: ICD-10-CM

## 2025-03-07 LAB
ALBUMIN SERPL-MCNC: 5 G/DL (ref 3.5–5)
ALP SERPL-CCNC: 85 U/L (ref 38–126)
ALT SERPL-CCNC: 20 U/L (ref 4–49)
ANION GAP SERPL CALC-SCNC: 16 MMOL/L
AST SERPL-CCNC: 29 U/L (ref 17–59)
BASOPHILS # BLD AUTO: 0.2 K/UL (ref 0–0.2)
BASOPHILS NFR BLD AUTO: 2 %
BUN SERPL-SCNC: 7 MG/DL (ref 9–20)
CALCIUM SPEC-MCNC: 9.1 MG/DL (ref 8.4–10.2)
CHLORIDE SERPL-SCNC: 109 MMOL/L (ref 98–107)
CO2 SERPL-SCNC: 23 MMOL/L (ref 22–30)
EOSINOPHIL # BLD AUTO: 0.3 K/UL (ref 0–0.7)
EOSINOPHIL NFR BLD AUTO: 4 %
ERYTHROCYTE [DISTWIDTH] IN BLOOD BY AUTOMATED COUNT: 5.14 M/UL (ref 4.3–5.9)
ERYTHROCYTE [DISTWIDTH] IN BLOOD: 13.4 % (ref 11.5–15.5)
GLUCOSE SERPL-MCNC: 94 MG/DL (ref 74–99)
HCT VFR BLD AUTO: 51.9 % (ref 39–53)
HGB BLD-MCNC: 16.3 GM/DL (ref 13–17.5)
LYMPHOCYTES # SPEC AUTO: 2.3 K/UL (ref 1–4.8)
LYMPHOCYTES NFR SPEC AUTO: 30 %
MAGNESIUM SPEC-SCNC: 2.2 MG/DL (ref 1.6–2.3)
MCH RBC QN AUTO: 31.6 PG (ref 25–35)
MCHC RBC AUTO-ENTMCNC: 31.3 G/DL (ref 31–37)
MCV RBC AUTO: 101 FL (ref 80–100)
MONOCYTES # BLD AUTO: 0.3 K/UL (ref 0–1)
MONOCYTES NFR BLD AUTO: 3 %
NEUTROPHILS # BLD AUTO: 4.4 K/UL (ref 1.3–7.7)
NEUTROPHILS NFR BLD AUTO: 57 %
PLATELET # BLD AUTO: 317 K/UL (ref 150–450)
POTASSIUM SERPL-SCNC: 4.6 MMOL/L (ref 3.5–5.1)
PROT SERPL-MCNC: 8 G/DL (ref 6.3–8.2)
SODIUM SERPL-SCNC: 148 MMOL/L (ref 137–145)
WBC # BLD AUTO: 7.7 K/UL (ref 3.8–10.6)

## 2025-03-07 PROCEDURE — 96360 HYDRATION IV INFUSION INIT: CPT

## 2025-03-07 PROCEDURE — 72125 CT NECK SPINE W/O DYE: CPT

## 2025-03-07 PROCEDURE — 80320 DRUG SCREEN QUANTALCOHOLS: CPT

## 2025-03-07 PROCEDURE — 83735 ASSAY OF MAGNESIUM: CPT

## 2025-03-07 PROCEDURE — 99406 BEHAV CHNG SMOKING 3-10 MIN: CPT

## 2025-03-07 PROCEDURE — 85025 COMPLETE CBC W/AUTO DIFF WBC: CPT

## 2025-03-07 PROCEDURE — 73562 X-RAY EXAM OF KNEE 3: CPT

## 2025-03-07 PROCEDURE — 36415 COLL VENOUS BLD VENIPUNCTURE: CPT

## 2025-03-07 PROCEDURE — 72100 X-RAY EXAM L-S SPINE 2/3 VWS: CPT

## 2025-03-07 PROCEDURE — 70450 CT HEAD/BRAIN W/O DYE: CPT

## 2025-03-07 PROCEDURE — 84100 ASSAY OF PHOSPHORUS: CPT

## 2025-03-07 PROCEDURE — 99284 EMERGENCY DEPT VISIT MOD MDM: CPT

## 2025-03-07 PROCEDURE — 80053 COMPREHEN METABOLIC PANEL: CPT

## 2025-03-07 RX ADMIN — CEFAZOLIN ONE MLS/HR: 330 INJECTION, POWDER, FOR SOLUTION INTRAMUSCULAR; INTRAVENOUS at 12:10

## 2025-03-07 RX ADMIN — HYDROMORPHONE HYDROCHLORIDE STA: 1 INJECTION, SOLUTION INTRAMUSCULAR; INTRAVENOUS; SUBCUTANEOUS at 13:02

## 2025-03-07 NOTE — XR
EXAMINATION TYPE: XR lumbar spine 2 or 3V

 

DATE OF EXAM: 3/7/2025

 

CLINICAL HISTORY: pain, fall

 

TECHNIQUE: Three views of the lumbar spine are submitted.

 

COMPARISON: None.

 

FINDINGS:  

The lumbar spine shows satisfactory alignment without evidence of acute fracture or dislocation. Vert
ebral body heights are within normal limits.   Disc spaces are within normal limits.  The overlying s
oft tissue appears unremarkable.

 

IMPRESSION:  

No acute fracture or dislocation is seen in the lumbar spine.

 

[facility], Workstation: [id number], 3/7/2025 1:26 PM

## 2025-03-07 NOTE — XR
EXAMINATION TYPE: XR knee complete RT

 

DATE OF EXAM: 3/7/2025 1:24 PM

 

INDICATION: 

Patient age:Male;  49 years old; 

Reason for study: Fall; PHH. pain

 

COMPARISON: None.

 

TECHNIQUE: The Right knee(s) was examined in Frontal, lateral and oblique projections.

 

FINDINGS:   No evidence of any acute osseous pathology, soft tissue swelling, or joint effusion is no
neal.

 

 

IMPRESSION: 

No acute osseous pathology.

 

[facility], Workstation: [id number], 3/7/2025 1:25 PM

## 2025-03-07 NOTE — ED
Fall HPI





- General


Chief Complaint: Fall


Stated Complaint: Fall


Time Seen by Provider: 03/07/25 11:14


Source: patient, family, EMS, RN notes reviewed


Mode of arrival: EMS





- History of Present Illness


Initial Comments: 


This is a 49-year-old male who presents to the emergency department for a fall. 

Patient slipped and fell on ice this morning, landing on his back and hitting 

his head.  Denies any loss of consciousness.  Not taking any blood thinners.  

Currently complaining of pain to his head, neck, lower back, and right knee.  

Patient also noted to be intoxicated on arrival.  States that he only had about 

3 beers.





MD Complaint: fall





- Related Data


                                Home Medications











 Medication  Instructions  Recorded  Confirmed


 


No Known Home Medications  01/05/25 03/07/25











                                    Allergies











Allergy/AdvReac Type Severity Reaction Status Date / Time


 


No Known Allergies Allergy   Verified 03/07/25 13:12














Review of Systems


ROS Statement: 


Those systems with pertinent positive or pertinent negative responses have been 

documented in the HPI.





ROS Other: All systems not noted in ROS Statement are negative.





Past Medical History


Past Medical History: No Reported History, CVA/TIA, Hearing Disorder / Deafness,

Myocardial Infarction (MI)


Additional Past Medical History / Comment(s): patient states he's had 2 strokes 

and 2 heart attacks. States he has never followed up with any physicians.right 

sided hearing loss


Last Myocardial Infarction Date:: unknown


History of Any Multi-Drug Resistant Organisms: None Reported


Past Surgical History: No Surgical Hx Reported


Past Anesthesia/Blood Transfusion Reactions: No Reported Reaction


Past Psychological History: No Psychological Hx Reported


Smoking Status: Current every day smoker


Past Alcohol Use History: Daily, Heavy


Past Drug Use History: None Reported





- Past Family History


  ** Mother


Family Medical History: Cancer


Additional Family Medical History / Comment(s): breast and ovarian





General Exam


General appearance: alert, appears intoxicated


Head exam: Present: atraumatic, normocephalic, normal inspection


Eye exam: Present: normal appearance, PERRL, EOMI.  Absent: scleral icterus, 

conjunctival injection, periorbital swelling


Respiratory exam: Present: normal lung sounds bilaterally.  Absent: respiratory 

distress, wheezes, rales, rhonchi, stridor


Cardiovascular Exam: Present: regular rate, normal rhythm


GI/Abdominal exam: Present: soft, normal bowel sounds.  Absent: distended, 

tenderness, guarding, rebound, rigid


Extremities exam: Present: other (Mild tenderness to palpation over the right 

knee.  No swelling or ecchymosis.  Full range of motion.  2+ DP and PT pulses)


Back exam: Present: other (Tenderness to palpation over the lower back)


Neurological exam: Present: alert, oriented X3, CN II-XII intact


Psychiatric exam: Present: normal affect, normal mood


Skin exam: Present: warm, dry, intact, normal color.  Absent: rash





Course


                                   Vital Signs











  03/07/25 03/07/25 03/07/25





  11:21 11:35 12:15


 


Temperature 98.1 F  


 


Pulse Rate 73 71 68


 


Respiratory 18 16 18





Rate   


 


Blood Pressure 141/98  127/78


 


O2 Sat by Pulse 98 95 96





Oximetry   














  03/07/25





  12:42


 


Temperature 


 


Pulse Rate 72


 


Respiratory 18





Rate 


 


Blood Pressure 123/82


 


O2 Sat by Pulse 95





Oximetry 














Medical Decision Making





- Medical Decision Making


This is a 49-year-old male who presents to the emergency department for a fall.





Was pt. sent in by a medical professional or institution?


@  -No   


Did you speak to anyone other than the patient for history?  


@  -No


Did you review nursing and triage notes? 


@  -Yes, and I agree, it is accurate with regards to the patient's symptoms.


Were old charts reviewed? 


@  -No


Differential Diagnosis? 


@  -Differential Diagnosis Head Injury:


Contusion, hematoma, intracranial hemorrhage, skull fracture, whiplash, 

concussion, this is not meant to be an all-inclusive list.


EKG interpreted by me (3pts min.)?


@  -Not obtained


X-rays interpreted by me (1pt min.)?


@  -X-ray of the lumbar spine and right knee obtained.  My interpretation 

identifies no acute fractures.


CT interpreted by me (1pt min.)?


@  -Computed tomography scan of the brain and c-spine obtained.  My 

interpretation identifies no evidence of an acute intracranial hemorrhage, skull

fracture, or cervical spine fracture.


U/S interpreted by me (1pt. min.)?


@  -Not obtained


What testing was considered but not performed? (CT, X-rays, U/S, labs)? Why?


@  -None


What meds were considered but not given? Why?


@  -None


Did you discuss the management of the patient with other professionals?


@  -No


Did you reconcile home meds?


@  -No


Was smoking cessation discussed for >3mins.?


@  -I discussed smoking cessation for greater than 3 minutes.  The risk of 

smoking were discussed with the patient including but not limited to risks of 

cancer, stroke, coronary artery disease and COPD.  Also discussed with patient w

ere multiple methods of quitting smoking.  Lastly we discussed the financial 

cost of smoking.


Was critical care preformed (if so, how long)?


@  -No


Were there social determinants of health that impacted care today? How? 

(Homelessness, low income, unemployed, alcoholism, drug addiction, 

transportation, low edu. Level, literacy, decrease access to med. care, long-term, 

rehab)?


@  -Alcoholism, contributing to the fall


Was there de-escalation of care discussed even if they declined? (Discuss DNR or

withdrawal of care, Hospice)?


@  -No


What co-morbidities impacted this encounter? (DM, HTN, Smoking, COPD, CAD, Cance

r, CVA, Hep., AIDS, mental health diagnosis, sleep apnea, morbid obesity)?


@  -Alcoholism, smoking


Was patient admitted / discharged?


@  -Discharged.  Lab work demonstrates an alcohol level of 291 and is otherwise 

unremarkable.  CT scan of the brain/c-spine obtained revealing no acute process.

 X-ray of the lumbar spine and right knee obtained as well, also revealing no 

acute process.  Pain was treated in the emergency department.  He then requested

discharge home.  Family member was at bedside and able to drive him home and 

care for him in his intoxicated state.  Advised ibuprofen and Tylenol as needed 

for pain relief.  Patient discharged home with family member in stable 

condition.  Case discussed with ED attending Dr. Alvarez.





Return precautions reviewed in depth, the patient is instructed to return to the

emergency department with any new, worsening, or concerning symptoms. Patient 

and family member verbalized understanding.


Undiagnosed new problem with uncertain prognosis?


@  -None


Drug Therapy requiring intensive monitoring for toxicity (Heparin, Nitro, 

Insulin, Cardizem)?


@  -None


Were any procedures done?


@  -None


Diagnosis/symptom?


@  -Fall, alcohol intoxication, head injury


Acute, or Chronic, or Acute on Chronic?


@  -Acute 


Uncomplicated (without systemic symptoms) or Complicated (systemic symptoms)?


@  -Uncomplicated


Side effects of treatment?


@  -None


Exacerbation, Progression, or Severe Exacerbation]


@  -Not applicable


Poses a threat to life or bodily function?


@  -No








- Lab Data


Result diagrams: 


                                 03/07/25 12:07





                                 03/07/25 12:07


                                   Lab Results











  03/07/25 03/07/25 Range/Units





  12:07 12:07 


 


WBC  7.7   (3.8-10.6)  k/uL


 


RBC  5.14   (4.30-5.90)  m/uL


 


Hgb  16.3   (13.0-17.5)  gm/dL


 


Hct  51.9   (39.0-53.0)  %


 


MCV  101.0 H   (80.0-100.0)  fL


 


MCH  31.6   (25.0-35.0)  pg


 


MCHC  31.3   (31.0-37.0)  g/dL


 


RDW  13.4   (11.5-15.5)  %


 


Plt Count  317   (150-450)  k/uL


 


MPV  7.5   


 


Neutrophils %  57   %


 


Lymphocytes %  30   %


 


Monocytes %  3   %


 


Eosinophils %  4   %


 


Basophils %  2   %


 


Neutrophils #  4.4   (1.3-7.7)  k/uL


 


Lymphocytes #  2.3   (1.0-4.8)  k/uL


 


Monocytes #  0.3   (0-1.0)  k/uL


 


Eosinophils #  0.3   (0-0.7)  k/uL


 


Basophils #  0.2   (0-0.2)  k/uL


 


Hypochromasia  Slight   


 


Macrocytosis  Slight   


 


Sodium   148 H  (137-145)  mmol/L


 


Potassium   4.6  (3.5-5.1)  mmol/L


 


Chloride   109 H  ()  mmol/L


 


Carbon Dioxide   23  (22-30)  mmol/L


 


Anion Gap   16  mmol/L


 


BUN   7 L  (9-20)  mg/dL


 


Creatinine   0.64 L  (0.66-1.25)  mg/dL


 


Est GFR (CKD-EPI)AfAm   >90  (>60 ml/min/1.73 sqM)  


 


Est GFR (CKD-EPI)NonAf   >90  (>60 ml/min/1.73 sqM)  


 


Glucose   94  (74-99)  mg/dL


 


Calcium   9.1  (8.4-10.2)  mg/dL


 


Phosphorus   4.1  (2.5-4.5)  mg/dL


 


Magnesium   2.2  (1.6-2.3)  mg/dL


 


Total Bilirubin   0.4  (0.2-1.3)  mg/dL


 


AST   29  (17-59)  U/L


 


ALT   20  (4-49)  U/L


 


Alkaline Phosphatase   85  ()  U/L


 


Total Protein   8.0  (6.3-8.2)  g/dL


 


Albumin   5.0  (3.5-5.0)  g/dL


 


Serum Alcohol   291 H*  mg/dL














- Radiology Data


Radiology results: report reviewed, image reviewed





Disposition


Clinical Impression: 


 Fall, Head injury, Alcohol intoxication, Nicotine dependence





Disposition: HOME SELF-CARE


Instructions (If sedation given, give patient instructions):  Fall Prevention 

(ED)


Additional Instructions: 


Return to the emergency department with any new, worsening, or concerning 

symptoms.  Alternate with ibuprofen and Tylenol as needed for pain relief.  

Follow up with your primary care provider in 1-2 days.


Is patient prescribed a controlled substance at d/c from ED?: No


Referrals: 


None,Stated [Primary Care Provider] - 1-2 days


Time of Disposition: 13:33

## 2025-03-07 NOTE — CT
EXAMINATION TYPE: CT brain cspine wo con

CT DLP: 1472.2 mGycm, Automated exposure control for dose reduction was used.

 

DATE OF EXAM: 3/7/2025 12:42 PM

 

COMPARISON: None.. 

 

CLINICAL INDICATION:Male, 49 years old with history of Fall; Fall, pain

 

TECHNIQUE: 

Brain: Multiple axial CT images of the brain were obtained without IV contrast. 

Cspine: Axial CT images from the skull base to the inferior aspect of T2 we obtained without intraven
ous contrast. Coronal and sagittal reformatted images were also reviewed. 

 

FINDINGS:

 

Brain:

Extra-axial spaces: No abnormal extra-axial fluid collections.

Ventricular system: Within normal limits

Cerebral parenchyma: No acute intraparenchymal hemorrhage or mass effect.  The gray-white junction is
 well differentiated. 

Cerebellum: Unremarkable.

Mass effect: No evidence of midline shift.

Intracranial vasculature: unremarkable

Soft tissues: Right cheek subcutaneous 1.6 cm cystic lesion (series 2033, image 17).

Calvarium/osseous structures: No depressed skull fracture. Left anterior maxillary cystic 2.0 cm lesi
on (series 2032, image 6). Nasal septal deviation to the right.

Paranasal sinuses and mastoid air cells: Clear.

Visualized orbits: Orbital contents are intact.

 

Cervical spine:

Fracture: None.

Osseous structures: Unremarkable 

Vertebral alignment: No spondylolisthesis. Levocurvature of the cervicothoracic spine.

Spinal canal/Neural Foramina: No evidence of significant spinal canal narrowing. No evidence for sign
ificant neural foraminal stenosis.

Neck soft tissues: Prevertebral soft tissues are within normal limits.

Other: The airway is patent. The lung apices are clear. Mild biapical paraseptal emphysematous change
s.

 

IMPRESSION:

1.  No acute intracranial process.

2.  No evidence of cervical spine fracture.

3.  Cystic lesion involving the left anterior maxilla. Probably represents an odontogenic cyst.

4.  Right cheek subcutaneous cystic lesion probably representing a benign lesion such as a sebaceous 
cyst/pilomatricoma.

 

[facility], Workstation: [id number], 3/7/2025 1:02 PM

## 2025-06-01 ENCOUNTER — HOSPITAL ENCOUNTER (EMERGENCY)
Dept: HOSPITAL 47 - EC | Age: 50
LOS: 1 days | Discharge: HOME | End: 2025-06-02
Payer: COMMERCIAL

## 2025-06-01 VITALS — RESPIRATION RATE: 18 BRPM

## 2025-06-01 DIAGNOSIS — F10.129: Primary | ICD-10-CM

## 2025-06-01 DIAGNOSIS — F17.200: ICD-10-CM

## 2025-06-01 DIAGNOSIS — R45.851: ICD-10-CM

## 2025-06-01 LAB
ALBUMIN SERPL-MCNC: 4.6 G/DL (ref 3.5–5)
ALP SERPL-CCNC: 119 U/L (ref 38–126)
ALT SERPL-CCNC: 21 U/L (ref 4–49)
ANION GAP SERPL CALC-SCNC: 15 MMOL/L
ANISOCYTOSIS BLD QL SMEAR: (no result)
AST SERPL-CCNC: 37 U/L (ref 17–59)
BASO STIPL BLD QL SMEAR: (no result)
BASOPHILS # BLD AUTO: 0.12 10*3/UL (ref 0–0.1)
BASOPHILS NFR BLD AUTO: 1.1 %
BILIRUB BLD-MCNC: 0.5 MG/DL (ref 0.2–1.3)
BUN SERPL-SCNC: 5 MG/DL (ref 9–20)
BURR CELLS BLD QL SMEAR: (no result)
CALCIUM SPEC-MCNC: 9.2 MG/DL (ref 8.4–10.2)
CHLORIDE SERPL-SCNC: 105 MMOL/L (ref 98–107)
CO2 SERPL-SCNC: 22 MMOL/L (ref 22–30)
CREATININE: 0.57 MG/DL (ref 0.66–1.25)
DACRYOCYTES BLD QL SMEAR: (no result)
DOHLE BOD BLD QL SMEAR: (no result)
EOSINOPHIL # BLD AUTO: 0.29 10*3/UL (ref 0.04–0.35)
EOSINOPHIL NFR BLD AUTO: 2.7 %
ERYTHROCYTE [DISTWIDTH] IN BLOOD BY AUTOMATED COUNT: 4.82 10*6/UL (ref 4.4–5.6)
ERYTHROCYTE [DISTWIDTH] IN BLOOD: 13.2 % (ref 11.5–14.5)
ETHANOL BLD-MCNC: 230 MG/DL
GLUCOSE SERPL-MCNC: 91 MG/DL (ref 74–99)
HCT VFR BLD AUTO: 45.9 % (ref 39.6–50)
HGB BLD-MCNC: 15.9 G/DL (ref 13–17)
HOWELL-JOLLY BOD BLD QL SMEAR: (no result)
HYPOCHROMIA BLD QL SMEAR: (no result)
IMM GRANULOCYTES # BLD: 0.02 10*3/UL (ref 0–0.04)
LG PLATELETS BLD QL SMEAR: (no result)
LYMPHOCYTES # SPEC AUTO: 2.75 10*3/UL (ref 0.9–5)
LYMPHOCYTES NFR SPEC AUTO: 25.6 %
Lab: (no result)
MAGNESIUM SPEC-SCNC: 2.1 MG/DL (ref 1.6–2.3)
MCH RBC QN AUTO: 33 PG (ref 27–32)
MCHC RBC AUTO-ENTMCNC: 34.6 G/DL (ref 32–37)
MCV RBC AUTO: 95.2 FL (ref 80–97)
MONOCYTES # BLD AUTO: 0.59 10*3/UL (ref 0.2–1)
MONOCYTES NFR BLD AUTO: 5.5 %
NEUTROPHILS # BLD AUTO: 6.96 10*3/UL (ref 1.8–7.7)
NEUTROPHILS NFR BLD AUTO: 64.9 %
NEUTS HYPERSEG # BLD: (no result) 10*3/UL
NRBC BLD AUTO-RTO: (no result) %
OVALOCYTES BLD QL SMEAR: (no result)
PELGER HUET CELLS BLD QL SMEAR: (no result)
PLATELET # BLD AUTO: 285 10*3/UL (ref 140–440)
PLATELETS.RETICULATED NFR BLD AUTO: (no result) %
PMV BLD AUTO: 9.4 FL (ref 9.5–12.2)
POIKILOCYTOSIS BLD QL SMEAR: (no result)
POIKILOCYTOSIS BLD QL SMEAR: (no result)
POLYCHROMASIA BLD QL SMEAR: (no result)
POTASSIUM SERPL-SCNC: 3.8 MMOL/L (ref 3.5–5.1)
PROT SERPL-MCNC: 7.5 G/DL (ref 6.3–8.2)
RBC MORPH BLD: (no result)
ROULEAUX BLD QL SMEAR: (no result)
SCHISTOCYTES BLD QL SMEAR: (no result)
SICKLE CELLS BLD QL SMEAR: (no result)
SODIUM SERPL-SCNC: 142 MMOL/L (ref 137–145)
SPHEROCYTES BLD QL SMEAR: (no result)
STOMATOCYTES BLD QL SMEAR: (no result)
TARGETS BLD QL SMEAR: (no result)
TOXIC GRANULES BLD QL SMEAR: (no result)
VARIANT LYMPHS BLD QL SMEAR: (no result)
WBC # BLD AUTO: 10.73 10*3/UL (ref 4.5–10)
WBC NRBC COR # BLD: (no result) K/UL
WBC TOXIC VACUOLES BLD QL SMEAR: (no result)

## 2025-06-01 PROCEDURE — 80053 COMPREHEN METABOLIC PANEL: CPT

## 2025-06-01 PROCEDURE — 99285 EMERGENCY DEPT VISIT HI MDM: CPT

## 2025-06-01 PROCEDURE — 80320 DRUG SCREEN QUANTALCOHOLS: CPT

## 2025-06-01 PROCEDURE — 96372 THER/PROPH/DIAG INJ SC/IM: CPT

## 2025-06-01 PROCEDURE — 85025 COMPLETE CBC W/AUTO DIFF WBC: CPT

## 2025-06-01 PROCEDURE — 83605 ASSAY OF LACTIC ACID: CPT

## 2025-06-01 PROCEDURE — 83735 ASSAY OF MAGNESIUM: CPT

## 2025-06-01 PROCEDURE — 96360 HYDRATION IV INFUSION INIT: CPT

## 2025-06-01 PROCEDURE — 36415 COLL VENOUS BLD VENIPUNCTURE: CPT

## 2025-06-01 PROCEDURE — 93005 ELECTROCARDIOGRAM TRACING: CPT

## 2025-06-01 RX ADMIN — CEFAZOLIN STA MLS/HR: 330 INJECTION, POWDER, FOR SOLUTION INTRAMUSCULAR; INTRAVENOUS at 17:08

## 2025-06-01 RX ADMIN — LORAZEPAM STA MG: 2 INJECTION INTRAMUSCULAR; INTRAVENOUS at 14:54

## 2025-06-02 VITALS — DIASTOLIC BLOOD PRESSURE: 60 MMHG | HEART RATE: 74 BPM | SYSTOLIC BLOOD PRESSURE: 113 MMHG

## 2025-06-10 ENCOUNTER — HOSPITAL ENCOUNTER (EMERGENCY)
Dept: HOSPITAL 47 - EC | Age: 50
Discharge: HOME | End: 2025-06-10
Payer: COMMERCIAL

## 2025-06-10 VITALS — RESPIRATION RATE: 18 BRPM

## 2025-06-10 VITALS — HEART RATE: 98 BPM | DIASTOLIC BLOOD PRESSURE: 82 MMHG | SYSTOLIC BLOOD PRESSURE: 127 MMHG | TEMPERATURE: 98.1 F

## 2025-06-10 DIAGNOSIS — F17.200: ICD-10-CM

## 2025-06-10 DIAGNOSIS — F10.129: Primary | ICD-10-CM
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
## 2025-06-10 PROCEDURE — 99284 EMERGENCY DEPT VISIT MOD MDM: CPT

## 2025-06-14 ENCOUNTER — HOSPITAL ENCOUNTER (EMERGENCY)
Dept: HOSPITAL 47 - EC | Age: 50
LOS: 1 days | Discharge: HOME | End: 2025-06-15
Payer: COMMERCIAL

## 2025-06-14 DIAGNOSIS — R45.851: Primary | ICD-10-CM
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
## 2025-06-14 DIAGNOSIS — Z86.73: ICD-10-CM

## 2025-06-14 DIAGNOSIS — F10.129: ICD-10-CM

## 2025-06-14 DIAGNOSIS — F17.200: ICD-10-CM
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
## 2025-06-14 PROCEDURE — 99285 EMERGENCY DEPT VISIT HI MDM: CPT

## 2025-06-14 PROCEDURE — 82075 ASSAY OF BREATH ETHANOL: CPT

## 2025-06-15 VITALS
RESPIRATION RATE: 18 BRPM | TEMPERATURE: 97.8 F | SYSTOLIC BLOOD PRESSURE: 136 MMHG | HEART RATE: 78 BPM | DIASTOLIC BLOOD PRESSURE: 87 MMHG

## 2025-06-15 RX ADMIN — NICOTINE STA PATCH: 14 PATCH, EXTENDED RELEASE TRANSDERMAL at 02:10
